# Patient Record
Sex: FEMALE | Race: WHITE | NOT HISPANIC OR LATINO | Employment: FULL TIME | URBAN - METROPOLITAN AREA
[De-identification: names, ages, dates, MRNs, and addresses within clinical notes are randomized per-mention and may not be internally consistent; named-entity substitution may affect disease eponyms.]

---

## 2017-01-24 RX ORDER — LOSARTAN POTASSIUM AND HYDROCHLOROTHIAZIDE 12.5; 1 MG/1; MG/1
TABLET ORAL
Qty: 90 TABLET | Refills: 2 | Status: SHIPPED | OUTPATIENT
Start: 2017-01-24 | End: 2017-10-28 | Stop reason: SDUPTHER

## 2017-04-12 ENCOUNTER — OFFICE VISIT (OUTPATIENT)
Dept: INTERNAL MEDICINE | Facility: CLINIC | Age: 52
End: 2017-04-12

## 2017-04-12 VITALS
HEIGHT: 65 IN | BODY MASS INDEX: 23.46 KG/M2 | WEIGHT: 140.8 LBS | SYSTOLIC BLOOD PRESSURE: 118 MMHG | DIASTOLIC BLOOD PRESSURE: 80 MMHG | HEART RATE: 74 BPM

## 2017-04-12 DIAGNOSIS — G89.4 CHRONIC PAIN SYNDROME: ICD-10-CM

## 2017-04-12 DIAGNOSIS — F39 MOOD DISORDER (HCC): ICD-10-CM

## 2017-04-12 DIAGNOSIS — Z12.31 ENCOUNTER FOR SCREENING MAMMOGRAM FOR BREAST CANCER: ICD-10-CM

## 2017-04-12 DIAGNOSIS — M54.2 NECK PAIN: ICD-10-CM

## 2017-04-12 DIAGNOSIS — J30.2 SEASONAL ALLERGIC RHINITIS, UNSPECIFIED ALLERGIC RHINITIS TRIGGER: ICD-10-CM

## 2017-04-12 DIAGNOSIS — I10 ESSENTIAL HYPERTENSION: Primary | ICD-10-CM

## 2017-04-12 DIAGNOSIS — R53.82 CHRONIC FATIGUE: ICD-10-CM

## 2017-04-12 DIAGNOSIS — F51.01 PRIMARY INSOMNIA: ICD-10-CM

## 2017-04-12 DIAGNOSIS — J45.20 MILD INTERMITTENT ASTHMA WITHOUT COMPLICATION: ICD-10-CM

## 2017-04-12 DIAGNOSIS — E55.9 VITAMIN D DEFICIENCY: ICD-10-CM

## 2017-04-12 PROCEDURE — 99214 OFFICE O/P EST MOD 30 MIN: CPT | Performed by: INTERNAL MEDICINE

## 2017-04-12 RX ORDER — FLUTICASONE PROPIONATE 50 MCG
2 SPRAY, SUSPENSION (ML) NASAL DAILY
Qty: 1 EACH | Refills: 12 | Status: SHIPPED | OUTPATIENT
Start: 2017-04-12 | End: 2017-05-12

## 2017-04-12 RX ORDER — LORAZEPAM 1 MG/1
1 TABLET ORAL EVERY 8 HOURS PRN
Qty: 50 TABLET | Refills: 0 | OUTPATIENT
Start: 2017-04-12 | End: 2017-06-08 | Stop reason: SDUPTHER

## 2017-04-12 RX ORDER — ESTRADIOL 0.1 MG/G
2 CREAM VAGINAL 2 TIMES WEEKLY
COMMUNITY
Start: 2017-02-09 | End: 2018-10-05 | Stop reason: SDUPTHER

## 2017-04-12 RX ORDER — HYDROCODONE BITARTRATE AND ACETAMINOPHEN 10; 325 MG/1; MG/1
1 TABLET ORAL EVERY 6 HOURS PRN
Qty: 60 TABLET | Refills: 0 | Status: SHIPPED | OUTPATIENT
Start: 2017-04-12 | End: 2017-08-18 | Stop reason: SDUPTHER

## 2017-04-12 RX ORDER — ESTRADIOL 0.07 MG/D
1 FILM, EXTENDED RELEASE TRANSDERMAL 2 TIMES WEEKLY
COMMUNITY
Start: 2017-02-09 | End: 2017-12-29

## 2017-04-12 NOTE — PROGRESS NOTES
"Geena Morrison is a 51 y.o. female here for   Chief Complaint   Patient presents with   • Follow-up     4 MO F/U   • Sinus Problem     X 1 WK, SORE THROAT, HA   • Hypertension   • Neck Pain   • Hyperlipidemia   .    Vitals:    04/12/17 0803   BP: 118/80   Pulse: 74   Weight: 140 lb 12.8 oz (63.9 kg)   Height: 65\" (165.1 cm)       Sinus Problem   This is a new problem. The current episode started in the past 7 days. The problem is unchanged. There has been no fever. She is experiencing no pain. Associated symptoms include congestion, coughing, neck pain, sneezing and a sore throat. Pertinent negatives include no chills, ear pain, shortness of breath or sinus pressure.   Hypertension   This is a chronic problem. The current episode started more than 1 year ago. The problem is unchanged. The problem is controlled. Associated symptoms include neck pain. Pertinent negatives include no chest pain, palpitations, peripheral edema or shortness of breath.   Neck Pain    This is a chronic problem. The current episode started more than 1 year ago. The problem occurs intermittently. The problem has been unchanged. The pain is moderate. Pertinent negatives include no chest pain, fever or trouble swallowing.        Encounter Diagnoses   Name Primary?   • Essential hypertension Yes   • Primary insomnia    • Chronic fatigue    • Chronic pain syndrome    • Mood disorder    • Encounter for screening mammogram for breast cancer    • Neck pain    • Mild intermittent asthma without complication    • Vitamin D deficiency    • Seasonal allergic rhinitis, unspecified allergic rhinitis trigger        The following portions of the patient's history were reviewed and updated as appropriate: allergies, current medications, past social history and problem list.    Review of Systems   Constitutional: Positive for fatigue. Negative for activity change, appetite change, chills and fever.   HENT: Positive for congestion, postnasal drip, " rhinorrhea, sneezing, sore throat and voice change. Negative for ear pain, sinus pressure and trouble swallowing.    Respiratory: Positive for cough. Negative for chest tightness, shortness of breath and wheezing.    Cardiovascular: Negative for chest pain, palpitations and leg swelling.   Musculoskeletal: Positive for neck pain.   Psychiatric/Behavioral: Positive for sleep disturbance. Negative for dysphoric mood. The patient is nervous/anxious.        Objective   Physical Exam   Constitutional: She appears well-developed and well-nourished. No distress.   HENT:   Head: Normocephalic.   Right Ear: External ear normal. Tympanic membrane is bulging. Tympanic membrane is not erythematous.   Left Ear: External ear normal. Tympanic membrane is bulging. Tympanic membrane is not erythematous.   Nose: Right sinus exhibits no frontal sinus tenderness. Left sinus exhibits no frontal sinus tenderness.   Mouth/Throat: Oropharynx is clear and moist. No oropharyngeal exudate.   Neck: Normal range of motion. Neck supple.   Cardiovascular: Normal rate, regular rhythm and normal heart sounds.    Pulmonary/Chest: Effort normal and breath sounds normal. No respiratory distress. She has no wheezes. She has no rales. She exhibits no tenderness.   Musculoskeletal: She exhibits no edema.   Lymphadenopathy:     She has no cervical adenopathy.   Psychiatric: She has a normal mood and affect. Her behavior is normal.   Nursing note and vitals reviewed.      Assessment/Plan   Problem List Items Addressed This Visit        Unprioritized    Asthma    Hypertension - Primary    Insomnia    Mood disorder    Relevant Medications    LORazepam (ATIVAN) 1 MG tablet    Neck pain     Epidural injections 2012           Relevant Medications    HYDROcodone-acetaminophen (NORCO)  MG per tablet    Chronic pain    Chronic fatigue    Relevant Medications    LORazepam (ATIVAN) 1 MG tablet    Vitamin D deficiency     D=25           Other Visit Diagnoses      Encounter for screening mammogram for breast cancer        Relevant Orders    Mammo Screening Bilateral With CAD    Seasonal allergic rhinitis, unspecified allergic rhinitis trigger        Relevant Medications    fluticasone (FLONASE) 50 MCG/ACT nasal spray         Increased sinus congestion from allergies - need to add allegra to singulair daily.  Need daily flonase & sinus salt water qid.  Call if sx persist/worsen.   Asthma stable.   HTN stable.     High chol - need rechk this yr.   Anxiety - ok to use ativan sparingly (no driving).   Chronic neck pain - ok to use hydrocodone sparingly (no driving).

## 2017-06-08 DIAGNOSIS — F39 MOOD DISORDER (HCC): ICD-10-CM

## 2017-06-08 RX ORDER — LORAZEPAM 1 MG/1
TABLET ORAL
Qty: 50 TABLET | Refills: 1 | OUTPATIENT
Start: 2017-06-08 | End: 2017-12-29 | Stop reason: SDUPTHER

## 2017-06-19 DIAGNOSIS — R52 PAIN: ICD-10-CM

## 2017-06-19 RX ORDER — GABAPENTIN 600 MG/1
TABLET ORAL
Qty: 90 TABLET | Refills: 3 | Status: SHIPPED | OUTPATIENT
Start: 2017-06-19 | End: 2017-08-18 | Stop reason: SDUPTHER

## 2017-08-18 ENCOUNTER — OFFICE VISIT (OUTPATIENT)
Dept: INTERNAL MEDICINE | Facility: CLINIC | Age: 52
End: 2017-08-18

## 2017-08-18 ENCOUNTER — APPOINTMENT (OUTPATIENT)
Dept: WOMENS IMAGING | Facility: HOSPITAL | Age: 52
End: 2017-08-18

## 2017-08-18 VITALS
HEIGHT: 64 IN | SYSTOLIC BLOOD PRESSURE: 118 MMHG | WEIGHT: 144.2 LBS | DIASTOLIC BLOOD PRESSURE: 78 MMHG | BODY MASS INDEX: 24.62 KG/M2

## 2017-08-18 DIAGNOSIS — F41.9 ANXIETY: ICD-10-CM

## 2017-08-18 DIAGNOSIS — R53.82 CHRONIC FATIGUE: ICD-10-CM

## 2017-08-18 DIAGNOSIS — J45.20 MILD INTERMITTENT ASTHMA WITHOUT COMPLICATION: ICD-10-CM

## 2017-08-18 DIAGNOSIS — F51.01 PRIMARY INSOMNIA: ICD-10-CM

## 2017-08-18 DIAGNOSIS — M54.2 NECK PAIN: Primary | ICD-10-CM

## 2017-08-18 DIAGNOSIS — Z12.31 ENCOUNTER FOR SCREENING MAMMOGRAM FOR BREAST CANCER: ICD-10-CM

## 2017-08-18 DIAGNOSIS — E55.9 VITAMIN D DEFICIENCY: ICD-10-CM

## 2017-08-18 DIAGNOSIS — F39 MOOD DISORDER (HCC): ICD-10-CM

## 2017-08-18 DIAGNOSIS — G89.4 CHRONIC PAIN SYNDROME: ICD-10-CM

## 2017-08-18 DIAGNOSIS — R52 PAIN: ICD-10-CM

## 2017-08-18 DIAGNOSIS — R73.09 ELEVATED GLUCOSE: ICD-10-CM

## 2017-08-18 DIAGNOSIS — I10 ESSENTIAL HYPERTENSION: ICD-10-CM

## 2017-08-18 PROCEDURE — 99214 OFFICE O/P EST MOD 30 MIN: CPT | Performed by: INTERNAL MEDICINE

## 2017-08-18 PROCEDURE — 77067 SCR MAMMO BI INCL CAD: CPT | Performed by: RADIOLOGY

## 2017-08-18 PROCEDURE — 77067 SCR MAMMO BI INCL CAD: CPT | Performed by: INTERNAL MEDICINE

## 2017-08-18 RX ORDER — GABAPENTIN 800 MG/1
TABLET ORAL
Qty: 90 TABLET | Refills: 1 | Status: SHIPPED | OUTPATIENT
Start: 2017-08-18 | End: 2017-10-27 | Stop reason: SDUPTHER

## 2017-08-18 RX ORDER — HYDROCODONE BITARTRATE AND ACETAMINOPHEN 10; 325 MG/1; MG/1
1 TABLET ORAL EVERY 6 HOURS PRN
Qty: 60 TABLET | Refills: 0 | Status: SHIPPED | OUTPATIENT
Start: 2017-08-18 | End: 2017-10-27 | Stop reason: SDUPTHER

## 2017-08-18 NOTE — PROGRESS NOTES
"Geena Morrison is a 51 y.o. female here for   Chief Complaint   Patient presents with   • Anxiety     4 month follow-up   • Hypertension     4 month follow-up   • Pain     4 month follow-up   .    Vitals:    08/18/17 0928   BP: 118/78   BP Location: Left arm   Patient Position: Sitting   Cuff Size: Adult   Weight: 144 lb 3.2 oz (65.4 kg)   Height: 64.25\" (163.2 cm)       Anxiety   Presents for follow-up visit. Symptoms include nervous/anxious behavior (need ativan 3x weekly). Patient reports no chest pain, palpitations or shortness of breath. Symptoms occur most days.       Hypertension   This is a chronic problem. The current episode started more than 1 year ago. The problem is unchanged. The problem is controlled. Associated symptoms include anxiety. Pertinent negatives include no chest pain, palpitations or shortness of breath.   Pain   This is a chronic problem. The current episode started more than 1 year ago. The problem occurs constantly. The problem has been gradually worsening. Associated symptoms include fatigue. Pertinent negatives include no chest pain, chills, coughing or fever.        Encounter Diagnoses   Name Primary?   • Neck pain Yes   • Mood disorder    • Vitamin D deficiency    • Essential hypertension    • Elevated glucose    • Chronic fatigue    • Chronic pain syndrome    • Anxiety    • Mild intermittent asthma without complication    • Pain    • Primary insomnia        The following portions of the patient's history were reviewed and updated as appropriate: allergies, current medications, past social history and problem list.    Review of Systems   Constitutional: Positive for fatigue. Negative for chills and fever.   Respiratory: Negative for cough, shortness of breath and wheezing.    Cardiovascular: Negative for chest pain, palpitations and leg swelling.   Psychiatric/Behavioral: Positive for sleep disturbance (60% better with gabapentin). Negative for dysphoric mood. The " patient is nervous/anxious (need ativan 3x weekly).        Objective   Physical Exam   Constitutional: She appears well-developed and well-nourished. No distress.   Cardiovascular: Normal rate, regular rhythm and normal heart sounds.    Pulmonary/Chest: No respiratory distress. She has no wheezes. She has no rales. She exhibits no tenderness.   Musculoskeletal: She exhibits no edema.   Psychiatric: She has a normal mood and affect. Her behavior is normal.   Nursing note and vitals reviewed.      Assessment/Plan   Problem List Items Addressed This Visit        Unprioritized    Asthma    Hypertension    Insomnia    Relevant Medications    gabapentin (NEURONTIN) 800 MG tablet    Mood disorder    Elevated glucose    Neck pain - Primary    Relevant Medications    HYDROcodone-acetaminophen (NORCO)  MG per tablet    Anxiety    Chronic pain    Chronic fatigue    Vitamin D deficiency      Other Visit Diagnoses     Pain        Relevant Medications    HYDROcodone-acetaminophen (NORCO)  MG per tablet    gabapentin (NEURONTIN) 800 MG tablet         Chronic pain - need to increase lexapro (from 1/2 to 1 daily) & gabapentin (from 600 to 800 mg/d).  Continue to use hydrocodone sparingly.   Insomnia - need to increase gabapentin to 800 mg qhs.  Call if no better. High chol & sugar - need diet & exercise.    Labs at next visit (4 mos)

## 2017-09-19 ENCOUNTER — PATIENT MESSAGE (OUTPATIENT)
Dept: INTERNAL MEDICINE | Facility: CLINIC | Age: 52
End: 2017-09-19

## 2017-09-19 NOTE — TELEPHONE ENCOUNTER
From: Adela Morrison  To: Jamila Huston MD  Sent: 9/19/2017 3:02 PM EDT  Subject: Prescription Question    Hello Dr. Huston, my appointment with you was on 8/18 and you gave me my 4 month Hydrocodone prescription for my severe neck pain. When I took the prescription to the pharmacy they couldn't fill it and said to bring it back. Since I had some tablets left I was not in any hurry to go back to the pharmacy. But then I ran out of the medicine. I have searched every day for over two weeks and haven't been able to locate my prescription. I'm thinking it got thrown away in the garbage by my 2 year old granddaughter (she likes to throw things away i.e., our keys, magazines, silverware, etc.). I was trying to see if I could just manage the pain without bothering you and taking over the counter meds, but I can't and the pain is making me not sleep and irritable at work and at home. Is there anyway you can rewrite the prescription? I've never had this happen before and I will be more careful in the future.

## 2017-10-27 DIAGNOSIS — R52 PAIN: ICD-10-CM

## 2017-10-27 DIAGNOSIS — F51.01 PRIMARY INSOMNIA: ICD-10-CM

## 2017-10-27 DIAGNOSIS — M54.2 NECK PAIN: ICD-10-CM

## 2017-10-27 RX ORDER — HYDROCODONE BITARTRATE AND ACETAMINOPHEN 10; 325 MG/1; MG/1
1 TABLET ORAL EVERY 6 HOURS PRN
Qty: 60 TABLET | Refills: 0 | Status: SHIPPED | OUTPATIENT
Start: 2017-10-27 | End: 2017-12-29 | Stop reason: SDUPTHER

## 2017-10-27 RX ORDER — GABAPENTIN 800 MG/1
TABLET ORAL
Qty: 180 TABLET | Refills: 1 | Status: SHIPPED | OUTPATIENT
Start: 2017-10-27 | End: 2017-10-30

## 2017-10-27 NOTE — TELEPHONE ENCOUNTER
----- Message from Cindy Tapia MA sent at 10/27/2017  1:59 PM EDT -----  Pt needs a new prescription as August script  before she could fill  Pt wants to  script today-very uncomfortable    Hydrocodone 10/325mg    lov   Next appt     Pt#[phone]

## 2017-10-27 NOTE — TELEPHONE ENCOUNTER
Please review refill request:    I called [facility] pharmacy listed and the pharmacist stated she has been jumping around to different stores and they ended up canceling out the rx from 8/18/17.     Last OV: 8/18/17    Last Prescribed: 8/18/17    ZEINA: Good until 12/19/17    Thank you

## 2017-10-28 DIAGNOSIS — F39 MOOD DISORDER (HCC): ICD-10-CM

## 2017-10-30 DIAGNOSIS — R52 PAIN: ICD-10-CM

## 2017-10-30 DIAGNOSIS — F51.01 PRIMARY INSOMNIA: ICD-10-CM

## 2017-10-30 RX ORDER — GABAPENTIN 800 MG/1
800 TABLET ORAL NIGHTLY
Qty: 90 TABLET | Refills: 1 | Status: SHIPPED | OUTPATIENT
Start: 2017-10-30 | End: 2017-12-29 | Stop reason: SDUPTHER

## 2017-10-30 RX ORDER — LOSARTAN POTASSIUM AND HYDROCHLOROTHIAZIDE 12.5; 1 MG/1; MG/1
TABLET ORAL
Qty: 90 TABLET | Refills: 2 | Status: SHIPPED | OUTPATIENT
Start: 2017-10-30 | End: 2018-08-09 | Stop reason: SDUPTHER

## 2017-10-30 RX ORDER — ESCITALOPRAM OXALATE 20 MG/1
TABLET ORAL
Qty: 90 TABLET | Refills: 2 | Status: SHIPPED | OUTPATIENT
Start: 2017-10-30 | End: 2017-12-29 | Stop reason: SDUPTHER

## 2017-12-29 ENCOUNTER — OFFICE VISIT (OUTPATIENT)
Dept: INTERNAL MEDICINE | Facility: CLINIC | Age: 52
End: 2017-12-29

## 2017-12-29 VITALS
SYSTOLIC BLOOD PRESSURE: 140 MMHG | WEIGHT: 149.6 LBS | BODY MASS INDEX: 25.54 KG/M2 | HEIGHT: 64 IN | DIASTOLIC BLOOD PRESSURE: 84 MMHG

## 2017-12-29 DIAGNOSIS — R52 PAIN: ICD-10-CM

## 2017-12-29 DIAGNOSIS — R73.09 ELEVATED GLUCOSE: ICD-10-CM

## 2017-12-29 DIAGNOSIS — J45.20 MILD INTERMITTENT ASTHMA WITHOUT COMPLICATION: ICD-10-CM

## 2017-12-29 DIAGNOSIS — M54.2 NECK PAIN: ICD-10-CM

## 2017-12-29 DIAGNOSIS — F39 MOOD DISORDER (HCC): ICD-10-CM

## 2017-12-29 DIAGNOSIS — F51.01 PRIMARY INSOMNIA: ICD-10-CM

## 2017-12-29 DIAGNOSIS — I10 ESSENTIAL HYPERTENSION: Primary | ICD-10-CM

## 2017-12-29 DIAGNOSIS — E55.9 VITAMIN D DEFICIENCY: ICD-10-CM

## 2017-12-29 DIAGNOSIS — G89.4 CHRONIC PAIN SYNDROME: ICD-10-CM

## 2017-12-29 DIAGNOSIS — Z78.0 MENOPAUSE: ICD-10-CM

## 2017-12-29 LAB
ALBUMIN SERPL-MCNC: 4 G/DL (ref 3.5–5.2)
ALBUMIN/GLOB SERPL: 1.3 G/DL
ALP SERPL-CCNC: 60 U/L (ref 39–117)
ALT SERPL-CCNC: 13 U/L (ref 1–33)
APPEARANCE UR: ABNORMAL
AST SERPL-CCNC: 21 U/L (ref 1–32)
BASOPHILS # BLD MANUAL: 0 10*3/MM3 (ref 0–0.2)
BASOPHILS NFR BLD MANUAL: 0 % (ref 0–1.5)
BILIRUB SERPL-MCNC: 0.8 MG/DL (ref 0.1–1.2)
BILIRUB UR QL STRIP: NEGATIVE
BUN SERPL-MCNC: 13 MG/DL (ref 6–20)
BUN/CREAT SERPL: 18.6 (ref 7–25)
CALCIUM SERPL-MCNC: 9.4 MG/DL (ref 8.6–10.5)
CHLORIDE SERPL-SCNC: 100 MMOL/L (ref 98–107)
CHOLEST SERPL-MCNC: 224 MG/DL (ref 0–200)
CO2 SERPL-SCNC: 29.9 MMOL/L (ref 22–29)
COLOR UR: YELLOW
CREAT SERPL-MCNC: 0.7 MG/DL (ref 0.57–1)
DIFFERENTIAL COMMENT: NORMAL
EOSINOPHIL # BLD MANUAL: 0.32 10*3/MM3 (ref 0–0.7)
EOSINOPHIL # BLD MANUAL: 5 % (ref 0.3–6.2)
ERYTHROCYTE [DISTWIDTH] IN BLOOD BY AUTOMATED COUNT: 12.5 % (ref 11.7–13)
GLOBULIN SER CALC-MCNC: 3.1 GM/DL
GLUCOSE SERPL-MCNC: 99 MG/DL (ref 65–99)
GLUCOSE UR QL: NEGATIVE
HBA1C MFR BLD: 5.3 % (ref 4.8–5.6)
HCT VFR BLD AUTO: 42.3 % (ref 35.6–45.5)
HDLC SERPL-MCNC: 63 MG/DL (ref 40–60)
HGB BLD-MCNC: 13.7 G/DL (ref 11.9–15.5)
HGB UR QL STRIP: NEGATIVE
KETONES UR QL STRIP: NEGATIVE
LABORATORY COMMENT REPORT: NORMAL
LDLC SERPL CALC-MCNC: 131 MG/DL (ref 0–100)
LEUKOCYTE ESTERASE UR QL STRIP: NEGATIVE
LYMPHOCYTES # BLD MANUAL: 2.17 10*3/MM3 (ref 0.9–4.8)
LYMPHOCYTES NFR BLD MANUAL: 34 % (ref 19.6–45.3)
MCH RBC QN AUTO: 31.5 PG (ref 26.9–32)
MCHC RBC AUTO-ENTMCNC: 32.4 G/DL (ref 32.4–36.3)
MCV RBC AUTO: 97.2 FL (ref 80.5–98.2)
MONOCYTES # BLD MANUAL: 0.38 10*3/MM3 (ref 0.2–1.2)
MONOCYTES NFR BLD MANUAL: 6 % (ref 5–12)
NEUTROPHILS # BLD MANUAL: 3.51 10*3/MM3 (ref 1.9–8.1)
NEUTROPHILS NFR BLD MANUAL: 55 % (ref 42.7–76)
NITRITE UR QL STRIP: NEGATIVE
PH UR STRIP.AUTO: 7 [PH] (ref 5–8)
PLATELET # BLD AUTO: 253 10*3/MM3 (ref 140–500)
PLT COMMENT: NORMAL
POTASSIUM SERPL-SCNC: 4.8 MMOL/L (ref 3.5–5.2)
PROT SERPL-MCNC: 7.1 G/DL (ref 6–8.5)
PROTEIN: NEGATIVE
RBC # BLD AUTO: 4.35 10*6/MM3 (ref 3.9–5.2)
SODIUM SERPL-SCNC: 140 MMOL/L (ref 136–145)
SP GR UR: 1.01 (ref 1–1.03)
TRIGL SERPL-MCNC: 152 MG/DL (ref 0–150)
TSH SERPL-ACNC: 2.24 MIU/ML (ref 0.27–4.2)
UROBILINOGEN UR STRIP-MCNC: ABNORMAL MG/DL
VLDLC SERPL-MCNC: 30.4 MG/DL (ref 5–40)
WBC # BLD AUTO: 6.39 10*3/MM3 (ref 4.5–10.7)

## 2017-12-29 PROCEDURE — 99214 OFFICE O/P EST MOD 30 MIN: CPT | Performed by: INTERNAL MEDICINE

## 2017-12-29 PROCEDURE — 36415 COLL VENOUS BLD VENIPUNCTURE: CPT | Performed by: INTERNAL MEDICINE

## 2017-12-29 RX ORDER — HYDROCHLOROTHIAZIDE 12.5 MG/1
12.5 TABLET ORAL DAILY
Qty: 30 TABLET | Refills: 1 | Status: SHIPPED | OUTPATIENT
Start: 2017-12-29 | End: 2018-08-27

## 2017-12-29 RX ORDER — HYDROCODONE BITARTRATE AND ACETAMINOPHEN 10; 325 MG/1; MG/1
1 TABLET ORAL EVERY 6 HOURS PRN
Qty: 60 TABLET | Refills: 0 | Status: SHIPPED | OUTPATIENT
Start: 2017-12-29 | End: 2018-05-29 | Stop reason: SDUPTHER

## 2017-12-29 RX ORDER — LORAZEPAM 1 MG/1
1 TABLET ORAL EVERY 8 HOURS PRN
Qty: 50 TABLET | Refills: 1 | OUTPATIENT
Start: 2017-12-29 | End: 2018-10-05 | Stop reason: SDUPTHER

## 2017-12-29 RX ORDER — ESTRADIOL 0.05 MG/D
1 FILM, EXTENDED RELEASE TRANSDERMAL WEEKLY
Qty: 24 PATCH | Refills: 3 | Status: SHIPPED | OUTPATIENT
Start: 2017-12-29 | End: 2019-06-10 | Stop reason: SDUPTHER

## 2017-12-29 RX ORDER — ESCITALOPRAM OXALATE 20 MG/1
20 TABLET ORAL DAILY
Qty: 90 TABLET | Refills: 2 | Status: SHIPPED | OUTPATIENT
Start: 2017-12-29 | End: 2018-10-05 | Stop reason: SDUPTHER

## 2017-12-29 RX ORDER — MONTELUKAST SODIUM 10 MG/1
10 TABLET ORAL NIGHTLY
Qty: 90 TABLET | Refills: 3 | Status: SHIPPED | OUTPATIENT
Start: 2017-12-29 | End: 2018-10-05 | Stop reason: SDUPTHER

## 2017-12-29 RX ORDER — GABAPENTIN 800 MG/1
800 TABLET ORAL NIGHTLY
Qty: 90 TABLET | Refills: 1 | Status: SHIPPED | OUTPATIENT
Start: 2017-12-29 | End: 2018-05-29 | Stop reason: SDUPTHER

## 2017-12-29 NOTE — PROGRESS NOTES
"Geena Morrison is a 52 y.o. female here for   Chief Complaint   Patient presents with   • Anxiety   • Hypertension   • Pain   .    Vitals:    12/29/17 1020 12/29/17 1101 12/29/17 1654   BP: 138/94 140/90 140/84   BP Location: Left arm     Patient Position: Sitting     Cuff Size: Adult     Weight: 67.9 kg (149 lb 9.6 oz)     Height: 163.2 cm (64.25\")         Body mass index is 25.48 kg/(m^2).    Anxiety   Presents for follow-up visit. Symptoms include nervous/anxious behavior. Patient reports no chest pain, palpitations or shortness of breath. Symptoms occur occasionally. The severity of symptoms is mild.       Hypertension   This is a chronic problem. The current episode started more than 1 year ago. The problem has been gradually worsening since onset. The problem is controlled. Associated symptoms include anxiety and neck pain. Pertinent negatives include no chest pain, palpitations or shortness of breath.   Pain   Associated symptoms include fatigue and neck pain. Pertinent negatives include no chest pain, chills, coughing or fever.        The following portions of the patient's history were reviewed and updated as appropriate: allergies, current medications, past social history and problem list.    Review of Systems   Constitutional: Positive for fatigue. Negative for chills and fever.   Respiratory: Negative for cough, shortness of breath and wheezing.    Cardiovascular: Negative for chest pain, palpitations and leg swelling.   Musculoskeletal: Positive for neck pain and neck stiffness.   Psychiatric/Behavioral: Negative for dysphoric mood and sleep disturbance. The patient is nervous/anxious.        Objective   Physical Exam   Constitutional: She appears well-developed and well-nourished. No distress.   Cardiovascular: Normal rate, regular rhythm and normal heart sounds.    Pulmonary/Chest: No respiratory distress. She has no wheezes. She has no rales. She exhibits no tenderness.   Musculoskeletal: " She exhibits no edema.   Psychiatric: She has a normal mood and affect. Her behavior is normal.   Nursing note and vitals reviewed.      Assessment/Plan   Diagnoses and all orders for this visit:    Essential hypertension  Comments:  borderline high - need weekly chk & call if bp over 140/90 - will need additional med if can't lower bp with diet/ex  Orders:  -     CBC Auto Differential; Future  -     Comprehensive Metabolic Panel; Future  -     Lipid Panel; Future  -     TSH; Future  -     Urinalysis With / Microscopic If Indicated - Urine, Clean Catch; Future  -     hydrochlorothiazide (HYDRODIURIL) 12.5 MG tablet; Take 1 tablet by mouth Daily.  -     CBC Auto Differential  -     Comprehensive Metabolic Panel  -     Lipid Panel  -     TSH  -     Urinalysis With / Microscopic If Indicated - Urine, Clean Catch    Elevated glucose  Comments:  need low sugar diet  Orders:  -     Hemoglobin A1c; Future  -     Hemoglobin A1c    Chronic pain syndrome  Comments:  neck pain - use narcotic sparingly - need stretches, correct posture, etc.    Mild intermittent asthma without complication  Comments:  call if sx  Orders:  -     montelukast (SINGULAIR) 10 MG tablet; Take 1 tablet by mouth Every Night.    Mood disorder  Comments:  stable - continue ativan sparingly (not daily)  Orders:  -     LORazepam (ATIVAN) 1 MG tablet; Take 1 tablet by mouth Every 8 (Eight) Hours As Needed for Anxiety.  -     escitalopram (LEXAPRO) 20 MG tablet; Take 1 tablet by mouth Daily.    Neck pain  Comments:  call if worse  Orders:  -     HYDROcodone-acetaminophen (NORCO)  MG per tablet; Take 1 tablet by mouth Every 6 (Six) Hours As Needed for Moderate Pain .    Pain  -     HYDROcodone-acetaminophen (NORCO)  MG per tablet; Take 1 tablet by mouth Every 6 (Six) Hours As Needed for Moderate Pain .  -     gabapentin (NEURONTIN) 800 MG tablet; Take 1 tablet by mouth Every Night.    Primary insomnia  Comments:  ok with nightly  gabapentin  Orders:  -     gabapentin (NEURONTIN) 800 MG tablet; Take 1 tablet by mouth Every Night.    Menopause  Comments:  need decreased dose of estrogen - will decrease again in 1 yr (or sooner if no severe hot flashes)  Orders:  -     estradiol (MINIVELLE, VIVELLE-DOT) 0.05 MG/24HR patch; Place 1 patch on the skin 1 (One) Time Per Week.    Vitamin D deficiency  Comments:  need 2,000 units/d

## 2018-04-25 NOTE — TELEPHONE ENCOUNTER
Patient informed her Norco  mg is ready for pick.   She asked that her Gabapentin be faxed over to [facility].      What Is The Reason For Today's Visit?: the risk of recurrence, and the development of new lesions

## 2018-04-27 ENCOUNTER — OFFICE VISIT (OUTPATIENT)
Dept: INTERNAL MEDICINE | Facility: CLINIC | Age: 53
End: 2018-04-27

## 2018-04-27 VITALS
SYSTOLIC BLOOD PRESSURE: 132 MMHG | BODY MASS INDEX: 23.05 KG/M2 | WEIGHT: 135 LBS | DIASTOLIC BLOOD PRESSURE: 80 MMHG | HEIGHT: 64 IN

## 2018-04-27 DIAGNOSIS — M25.511 ACUTE PAIN OF RIGHT SHOULDER: Primary | ICD-10-CM

## 2018-04-27 PROCEDURE — 99213 OFFICE O/P EST LOW 20 MIN: CPT | Performed by: NURSE PRACTITIONER

## 2018-04-27 RX ORDER — CYCLOBENZAPRINE HCL 10 MG
10 TABLET ORAL 3 TIMES DAILY PRN
Qty: 30 TABLET | Refills: 0 | Status: SHIPPED | OUTPATIENT
Start: 2018-04-27 | End: 2018-08-27

## 2018-04-27 RX ORDER — METHYLPREDNISOLONE 4 MG/1
TABLET ORAL
Qty: 21 TABLET | Refills: 0 | Status: SHIPPED | OUTPATIENT
Start: 2018-04-27 | End: 2018-05-04 | Stop reason: SDUPTHER
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
## 2018-05-04 DIAGNOSIS — M25.511 ACUTE PAIN OF RIGHT SHOULDER: Primary | ICD-10-CM

## 2018-05-04 DIAGNOSIS — M25.511 ACUTE PAIN OF RIGHT SHOULDER: ICD-10-CM

## 2018-05-04 RX ORDER — METHYLPREDNISOLONE 4 MG/1
TABLET ORAL
Qty: 21 TABLET | Refills: 0 | Status: SHIPPED | OUTPATIENT
Start: 2018-05-04 | End: 2018-05-10

## 2018-05-29 ENCOUNTER — OFFICE VISIT (OUTPATIENT)
Dept: INTERNAL MEDICINE | Facility: CLINIC | Age: 53
End: 2018-05-29

## 2018-05-29 VITALS
BODY MASS INDEX: 21.89 KG/M2 | DIASTOLIC BLOOD PRESSURE: 84 MMHG | HEIGHT: 64 IN | SYSTOLIC BLOOD PRESSURE: 128 MMHG | WEIGHT: 128.2 LBS

## 2018-05-29 DIAGNOSIS — F51.01 PRIMARY INSOMNIA: ICD-10-CM

## 2018-05-29 DIAGNOSIS — R52 PAIN: ICD-10-CM

## 2018-05-29 DIAGNOSIS — M25.511 CHRONIC RIGHT SHOULDER PAIN: ICD-10-CM

## 2018-05-29 DIAGNOSIS — R73.09 ELEVATED GLUCOSE: ICD-10-CM

## 2018-05-29 DIAGNOSIS — G89.29 CHRONIC RIGHT SHOULDER PAIN: ICD-10-CM

## 2018-05-29 DIAGNOSIS — I10 ESSENTIAL HYPERTENSION: Primary | ICD-10-CM

## 2018-05-29 DIAGNOSIS — M54.2 NECK PAIN: ICD-10-CM

## 2018-05-29 DIAGNOSIS — R53.82 CHRONIC FATIGUE: ICD-10-CM

## 2018-05-29 DIAGNOSIS — F39 MOOD DISORDER (HCC): ICD-10-CM

## 2018-05-29 DIAGNOSIS — E55.9 VITAMIN D DEFICIENCY: ICD-10-CM

## 2018-05-29 PROCEDURE — 99213 OFFICE O/P EST LOW 20 MIN: CPT | Performed by: INTERNAL MEDICINE

## 2018-05-29 RX ORDER — GABAPENTIN 800 MG/1
800 TABLET ORAL NIGHTLY
Qty: 90 TABLET | Refills: 1 | Status: SHIPPED | OUTPATIENT
Start: 2018-05-29 | End: 2018-10-05 | Stop reason: SDUPTHER

## 2018-05-29 RX ORDER — HYDROCODONE BITARTRATE AND ACETAMINOPHEN 10; 325 MG/1; MG/1
1 TABLET ORAL EVERY 6 HOURS PRN
Qty: 60 TABLET | Refills: 0 | Status: SHIPPED | OUTPATIENT
Start: 2018-05-29 | End: 2018-10-05 | Stop reason: SDUPTHER

## 2018-05-29 NOTE — PROGRESS NOTES
"Geena Morrison is a 52 y.o. female here for   Chief Complaint   Patient presents with   • Anxiety   • Hypertension   • Pain   .    Vitals:    05/29/18 1032   BP: 128/84   BP Location: Left arm   Patient Position: Sitting   Cuff Size: Adult   Weight: 58.2 kg (128 lb 3.2 oz)   Height: 163.2 cm (64.25\")       Body mass index is 21.83 kg/m².    Anxiety   Presents for follow-up visit. Symptoms include excessive worry, insomnia and nervous/anxious behavior. Patient reports no chest pain, palpitations or shortness of breath.       Hypertension   This is a chronic problem. The current episode started more than 1 year ago. The problem is unchanged. The problem is controlled. Associated symptoms include anxiety. Pertinent negatives include no chest pain, palpitations or shortness of breath.   Pain   This is a new problem. The current episode started more than 1 month ago. The problem occurs constantly. The problem has been unchanged. Associated symptoms include arthralgias (right shoulder no better) and fatigue. Pertinent negatives include no chest pain, chills, coughing or fever. She has tried rest, ice and heat for the symptoms.        The following portions of the patient's history were reviewed and updated as appropriate: allergies, current medications, past social history and problem list.    Review of Systems   Constitutional: Positive for fatigue. Negative for chills and fever.   Respiratory: Negative for cough, shortness of breath and wheezing.    Cardiovascular: Negative for chest pain, palpitations and leg swelling.   Musculoskeletal: Positive for arthralgias (right shoulder no better).   Psychiatric/Behavioral: Negative for dysphoric mood and sleep disturbance. The patient is nervous/anxious and has insomnia.        Objective   Physical Exam   Constitutional: She appears well-developed and well-nourished. No distress.   Cardiovascular: Normal rate, regular rhythm and normal heart sounds.  "   Pulmonary/Chest: No respiratory distress. She has no wheezes. She has no rales. She exhibits no tenderness.   Musculoskeletal: She exhibits no edema.   Psychiatric: She has a normal mood and affect. Her behavior is normal.   Nursing note and vitals reviewed.      Assessment/Plan   Diagnoses and all orders for this visit:    Essential hypertension  Comments:  stable - call if bp over 140/90    Vitamin D deficiency  Comments:  need daily vit d    Mood disorder  Comments:  stable    Elevated glucose    Chronic fatigue  Comments:  stable    Neck pain  Comments:  call if worse  Orders:  -     HYDROcodone-acetaminophen (NORCO)  MG per tablet; Take 1 tablet by mouth Every 6 (Six) Hours As Needed for Moderate Pain .    Pain  Comments:  need to use hydrocodone only for signif pain  Orders:  -     HYDROcodone-acetaminophen (NORCO)  MG per tablet; Take 1 tablet by mouth Every 6 (Six) Hours As Needed for Moderate Pain .  -     gabapentin (NEURONTIN) 800 MG tablet; Take 1 tablet by mouth Every Night.    Primary insomnia  Comments:  ok with nightly gabapentin (she states no side effects)  Orders:  -     gabapentin (NEURONTIN) 800 MG tablet; Take 1 tablet by mouth Every Night.    Chronic right shoulder pain  Comments:  keep appt with ortho - call if problems

## 2018-08-09 RX ORDER — LOSARTAN POTASSIUM AND HYDROCHLOROTHIAZIDE 12.5; 1 MG/1; MG/1
TABLET ORAL
Qty: 90 TABLET | Refills: 2 | Status: SHIPPED | OUTPATIENT
Start: 2018-08-09 | End: 2019-03-04 | Stop reason: SDUPTHER

## 2018-08-10 ENCOUNTER — TELEPHONE (OUTPATIENT)
Dept: INTERNAL MEDICINE | Facility: CLINIC | Age: 53
End: 2018-08-10

## 2018-08-10 RX ORDER — NITROFURANTOIN 25; 75 MG/1; MG/1
100 CAPSULE ORAL 2 TIMES DAILY
COMMUNITY
End: 2018-08-27

## 2018-08-10 NOTE — TELEPHONE ENCOUNTER
----- Message from Abeba Rushing sent at 8/10/2018  2:13 PM EDT -----  Contact: Harshil Jean.   Pharmacy is calling to get clarification on or to change    RX:Macro[facility]  Would like to change because back order.    Caller#[phone]

## 2018-08-27 ENCOUNTER — OFFICE VISIT (OUTPATIENT)
Dept: INTERNAL MEDICINE | Facility: CLINIC | Age: 53
End: 2018-08-27

## 2018-08-27 VITALS
TEMPERATURE: 97.6 F | WEIGHT: 125 LBS | SYSTOLIC BLOOD PRESSURE: 114 MMHG | BODY MASS INDEX: 21.29 KG/M2 | DIASTOLIC BLOOD PRESSURE: 78 MMHG

## 2018-08-27 DIAGNOSIS — N76.0 BACTERIAL VAGINOSIS: ICD-10-CM

## 2018-08-27 DIAGNOSIS — B96.89 BACTERIAL VAGINOSIS: ICD-10-CM

## 2018-08-27 DIAGNOSIS — R30.0 DYSURIA: Primary | ICD-10-CM

## 2018-08-27 DIAGNOSIS — N30.90 CYSTITIS: ICD-10-CM

## 2018-08-27 LAB
BACTERIA UR QL AUTO: ABNORMAL /HPF
BILIRUB UR QL STRIP: NEGATIVE
CLARITY UR: ABNORMAL
CLUE CELLS SPEC QL WET PREP: ABNORMAL
COLOR UR: YELLOW
GLUCOSE UR STRIP-MCNC: NEGATIVE MG/DL
HGB UR QL STRIP.AUTO: ABNORMAL
HYALINE CASTS UR QL AUTO: ABNORMAL /LPF
HYDATID CYST SPEC WET PREP: ABNORMAL
KETONES UR QL STRIP: NEGATIVE
KOH PREP NAIL: NORMAL
LEUKOCYTE ESTERASE UR QL STRIP.AUTO: ABNORMAL
MUCOUS THREADS URNS QL MICRO: ABNORMAL /HPF
NITRITE UR QL STRIP: POSITIVE
PH UR STRIP.AUTO: 6 [PH] (ref 5–8)
PROT UR QL STRIP: NEGATIVE
RBC # UR: ABNORMAL /HPF
REF LAB TEST METHOD: ABNORMAL
SP GR UR STRIP: 1.02 (ref 1–1.03)
SQUAMOUS #/AREA URNS HPF: ABNORMAL /HPF
T VAGINALIS SPEC QL WET PREP: ABNORMAL
UROBILINOGEN UR QL STRIP: ABNORMAL
WBC CLUMPS # UR AUTO: ABNORMAL /HPF
WBC SPEC QL WET PREP: ABNORMAL
WBC UR QL AUTO: ABNORMAL /HPF
YEAST GENITAL QL WET PREP: ABNORMAL

## 2018-08-27 PROCEDURE — 87220 TISSUE EXAM FOR FUNGI: CPT | Performed by: NURSE PRACTITIONER

## 2018-08-27 PROCEDURE — 87077 CULTURE AEROBIC IDENTIFY: CPT | Performed by: NURSE PRACTITIONER

## 2018-08-27 PROCEDURE — 87210 SMEAR WET MOUNT SALINE/INK: CPT | Performed by: NURSE PRACTITIONER

## 2018-08-27 PROCEDURE — 87186 SC STD MICRODIL/AGAR DIL: CPT | Performed by: NURSE PRACTITIONER

## 2018-08-27 PROCEDURE — 99213 OFFICE O/P EST LOW 20 MIN: CPT | Performed by: NURSE PRACTITIONER

## 2018-08-27 PROCEDURE — 81001 URINALYSIS AUTO W/SCOPE: CPT | Performed by: NURSE PRACTITIONER

## 2018-08-27 PROCEDURE — 87086 URINE CULTURE/COLONY COUNT: CPT | Performed by: NURSE PRACTITIONER

## 2018-08-27 RX ORDER — SULFAMETHOXAZOLE AND TRIMETHOPRIM 800; 160 MG/1; MG/1
1 TABLET ORAL 2 TIMES DAILY
Qty: 10 TABLET | Refills: 0 | Status: SHIPPED | OUTPATIENT
Start: 2018-08-27 | End: 2018-08-29 | Stop reason: ALTCHOICE

## 2018-08-27 RX ORDER — METRONIDAZOLE 7.5 MG/G
GEL VAGINAL NIGHTLY
Qty: 70 G | Refills: 0 | Status: SHIPPED | OUTPATIENT
Start: 2018-08-27 | End: 2018-09-01

## 2018-08-27 NOTE — PROGRESS NOTES
Subjective   Adela Morrison is a 52 y.o. female.     She was prescribed Macrobid via my chart on 8/10/2018 and completed course without much relief. She is current with keto diet. She did just start back with intercourse a couple months ago with a new partner.        Difficulty Urinating   This is a new problem. The current episode started more than 1 month ago. The problem has been gradually worsening. Associated symptoms include urinary symptoms (nocturia, burning, and urinary odor and fishy ). Pertinent negatives include no abdominal pain, change in bowel habit, chest pain, fever, nausea or vomiting. Treatments tried: macrobid and increased water intake  The treatment provided mild relief.        The following portions of the patient's history were reviewed and updated as appropriate: allergies, current medications, past social history and problem list.    Review of Systems   Constitutional: Negative for fever.   Cardiovascular: Negative for chest pain.   Gastrointestinal: Negative for abdominal pain, change in bowel habit, nausea and vomiting.   Genitourinary: Positive for difficulty urinating. Negative for frequency, hematuria and urgency.        Nocturia and fishy odor        Objective   Physical Exam   Constitutional: She is oriented to person, place, and time. She appears well-developed and well-nourished.   HENT:   Head: Normocephalic.   Nose: Nose normal.   Cardiovascular: Regular rhythm and normal heart sounds.  Exam reveals no S3 and no S4.    No murmur heard.  Pulmonary/Chest: Effort normal and breath sounds normal. She has no decreased breath sounds. She has no wheezes. She has no rhonchi. She has no rales.   Abdominal: Normal appearance and bowel sounds are normal. There is no tenderness. There is no CVA tenderness.   Genitourinary: There is no rash or tenderness on the right labia. There is no rash or tenderness on the left labia. No signs of injury around the vagina. Vaginal discharge (whitish gray  discharge and fishy odor ) found.   Musculoskeletal: She exhibits no edema.   Neurological: She is alert and oriented to person, place, and time. Gait normal.   Skin: Skin is warm and dry.   Psychiatric: She has a normal mood and affect.       Assessment/Plan   Adela was seen today for difficulty urinating.    Diagnoses and all orders for this visit:    Dysuria  -     Urinalysis With Microscopic If Indicated (No Culture) - Urine, Clean Catch; Future  -     Urinalysis With Microscopic If Indicated (No Culture) - Urine, Clean Catch  -     Urinalysis, Microscopic Only - Urine, Clean Catch; Future  -     Urinalysis, Microscopic Only - Urine, Clean Catch  -     Urine Culture - Urine, Urine, Clean Catch; Future  -     Urine Culture - Urine, Urine, Clean Catch    Cystitis  -     sulfamethoxazole-trimethoprim (BACTRIM DS,SEPTRA DS) 800-160 MG per tablet; Take 1 tablet by mouth 2 (Two) Times a Day for 5 days.    Bacterial vaginosis  -     Wet Prep, Genital - Swab, Vagina  -     KOH Prep - Swab, Vagina  -     Cancel: Chlamydia trachomatis, Neisseria gonorrhoeae, PCR - Urine, Vagina; Future  -     metroNIDAZOLE (METROGEL VAGINAL) 0.75 % vaginal gel; Insert  into the vagina Every Night for 5 days.  -     Chlamydia trachomatis, Neisseria gonorrhoeae, PCR - Urine, Urine, Clean Catch; Future  -     Chlamydia trachomatis, Neisseria gonorrhoeae, PCR - Urine, Urine, Clean Catch    Urine with bacteria, wbc, and nitrate. Sent for urine culture.   KOH/Wet prep with clue cells, bacteria and positive whiff test. She was advised no alcohol while on antibiotic.

## 2018-08-27 NOTE — PATIENT INSTRUCTIONS
Bacterial Vaginosis  Bacterial vaginosis is a vaginal infection that occurs when the normal balance of bacteria in the vagina is disrupted. It results from an overgrowth of certain bacteria. This is the most common vaginal infection among women ages 15-44.  Because bacterial vaginosis increases your risk for STIs (sexually transmitted infections), getting treated can help reduce your risk for chlamydia, gonorrhea, herpes, and HIV (human immunodeficiency virus). Treatment is also important for preventing complications in pregnant women, because this condition can cause an early (premature) delivery.  What are the causes?  This condition is caused by an increase in harmful bacteria that are normally present in small amounts in the vagina. However, the reason that the condition develops is not fully understood.  What increases the risk?  The following factors may make you more likely to develop this condition:  · Having a new sexual partner or multiple sexual partners.  · Having unprotected sex.  · Douching.  · Having an intrauterine device (IUD).  · Smoking.  · Drug and alcohol abuse.  · Taking certain antibiotic medicines.  · Being pregnant.    You cannot get bacterial vaginosis from toilet seats, bedding, swimming pools, or contact with objects around you.  What are the signs or symptoms?  Symptoms of this condition include:  · Grey or white vaginal discharge. The discharge can also be watery or foamy.  · A fish-like odor with discharge, especially after sexual intercourse or during menstruation.  · Itching in and around the vagina.  · Burning or pain with urination.    Some women with bacterial vaginosis have no signs or symptoms.  How is this diagnosed?  This condition is diagnosed based on:  · Your medical history.  · A physical exam of the vagina.  · Testing a sample of vaginal fluid under a microscope to look for a large amount of bad bacteria or abnormal cells. Your health care provider may use a cotton swab  or a small wooden spatula to collect the sample.    How is this treated?  This condition is treated with antibiotics. These may be given as a pill, a vaginal cream, or a medicine that is put into the vagina (suppository). If the condition comes back after treatment, a second round of antibiotics may be needed.  Follow these instructions at home:  Medicines  · Take over-the-counter and prescription medicines only as told by your health care provider.  · Take or use your antibiotic as told by your health care provider. Do not stop taking or using the antibiotic even if you start to feel better.  General instructions  · If you have a female sexual partner, tell her that you have a vaginal infection. She should see her health care provider and be treated if she has symptoms. If you have a male sexual partner, he does not need treatment.  · During treatment:  ? Avoid sexual activity until you finish treatment.  ? Do not douche.  ? Avoid alcohol as directed by your health care provider.  ? Avoid breastfeeding as directed by your health care provider.  · Drink enough water and fluids to keep your urine clear or pale yellow.  · Keep the area around your vagina and rectum clean.  ? Wash the area daily with warm water.  ? Wipe yourself from front to back after using the toilet.  · Keep all follow-up visits as told by your health care provider. This is important.  How is this prevented?  · Do not douche.  · Wash the outside of your vagina with warm water only.  · Use protection when having sex. This includes latex condoms and dental dams.  · Limit how many sexual partners you have. To help prevent bacterial vaginosis, it is best to have sex with just one partner (monogamous).  · Make sure you and your sexual partner are tested for STIs.  · Wear cotton or cotton-lined underwear.  · Avoid wearing tight pants and pantyhose, especially during summer.  · Limit the amount of alcohol that you drink.  · Do not use any products that  contain nicotine or tobacco, such as cigarettes and e-cigarettes. If you need help quitting, ask your health care provider.  · Do not use illegal drugs.  Where to find more information:  · Centers for Disease Control and Prevention: www.cdc.gov/std  · American Sexual Health Association (IGNACIO): www.ashastd.org  · U.[place]. Department of Health and Human Services, Office on Women's Health: www.womenshealth.gov/ or https://www.womenshealth.gov/a-z-topics/bacterial-vaginosis  Contact a health care provider if:  · Your symptoms do not improve, even after treatment.  · You have more discharge or pain when urinating.  · You have a fever.  · You have pain in your abdomen.  · You have pain during sex.  · You have vaginal bleeding between periods.  Summary  · Bacterial vaginosis is a vaginal infection that occurs when the normal balance of bacteria in the vagina is disrupted.  · Because bacterial vaginosis increases your risk for STIs (sexually transmitted infections), getting treated can help reduce your risk for chlamydia, gonorrhea, herpes, and HIV (human immunodeficiency virus). Treatment is also important for preventing complications in pregnant women, because the condition can cause an early (premature) delivery.  · This condition is treated with antibiotic medicines. These may be given as a pill, a vaginal cream, or a medicine that is put into the vagina (suppository).  This information is not intended to replace advice given to you by your health care provider. Make sure you discuss any questions you have with your health care provider.  Document Released: 12/18/2006 Document Revised: 04/23/2018 Document Reviewed: 09/02/2017  [email or web address] Interactive Patient Education © 2018 [email or web address]  Urinary Tract Infection, Adult  A urinary tract infection (UTI) is an infection of any part of the urinary tract, which includes the kidneys, ureters, bladder, and urethra. These organs make, store, and get rid of urine in the body. UTI can  be a bladder infection (cystitis) or kidney infection (pyelonephritis).  What are the causes?  This infection may be caused by fungi, viruses, or bacteria. Bacteria are the most common cause of UTIs. This condition can also be caused by repeated incomplete emptying of the bladder during urination.  What increases the risk?  This condition is more likely to develop if:  · You ignore your need to urinate or hold urine for long periods of time.  · You do not empty your bladder completely during urination.  · You wipe back to front after urinating or having a bowel movement, if you are female.  · You are uncircumcised, if you are male.  · You are constipated.  · You have a urinary catheter that stays in place (indwelling).  · You have a weak defense (immune) system.  · You have a medical condition that affects your bowels, kidneys, or bladder.  · You have diabetes.  · You take antibiotic medicines frequently or for long periods of time, and the antibiotics no longer work well against certain types of infections (antibiotic resistance).  · You take medicines that irritate your urinary tract.  · You are exposed to chemicals that irritate your urinary tract.  · You are female.    What are the signs or symptoms?  Symptoms of this condition include:  · Fever.  · Frequent urination or passing small amounts of urine frequently.  · Needing to urinate urgently.  · Pain or burning with urination.  · Urine that smells bad or unusual.  · Cloudy urine.  · Pain in the lower abdomen or back.  · Trouble urinating.  · Blood in the urine.  · Vomiting or being less hungry than normal.  · Diarrhea or abdominal pain.  · Vaginal discharge, if you are female.    How is this diagnosed?  This condition is diagnosed with a medical history and physical exam. You will also need to provide a urine sample to test your urine. Other tests may be done, including:  · Blood tests.  · Sexually transmitted disease (STD) testing.    If you have had more  than one UTI, a cystoscopy or imaging studies may be done to determine the cause of the infections.  How is this treated?  Treatment for this condition often includes a combination of two or more of the following:  · Antibiotic medicine.  · Other medicines to treat less common causes of UTI.  · Over-the-counter medicines to treat pain.  · Drinking enough water to stay hydrated.    Follow these instructions at home:  · Take over-the-counter and prescription medicines only as told by your health care provider.  · If you were prescribed an antibiotic, take it as told by your health care provider. Do not stop taking the antibiotic even if you start to feel better.  · Avoid alcohol, caffeine, tea, and carbonated beverages. They can irritate your bladder.  · Drink enough fluid to keep your urine clear or pale yellow.  · Keep all follow-up visits as told by your health care provider. This is important.  · Make sure to:  ? Empty your bladder often and completely. Do not hold urine for long periods of time.  ? Empty your bladder before and after sex.  ? Wipe from front to back after a bowel movement if you are female. Use each tissue one time when you wipe.  Contact a health care provider if:  · You have back pain.  · You have a fever.  · You feel nauseous or vomit.  · Your symptoms do not get better after 3 days.  · Your symptoms go away and then return.  Get help right away if:  · You have severe back pain or lower abdominal pain.  · You are vomiting and cannot keep down any medicines or water.  This information is not intended to replace advice given to you by your health care provider. Make sure you discuss any questions you have with your health care provider.  Document Released: 09/27/2006 Document Revised: 05/31/2017 Document Reviewed: 11/07/2016  [employer] Interactive Patient Education © 2018 [employer]

## 2018-08-28 LAB
C TRACH RRNA SPEC DONR QL NAA+PROBE: NEGATIVE
N GONORRHOEA DNA SPEC QL NAA+PROBE: NEGATIVE

## 2018-08-29 LAB — BACTERIA SPEC AEROBE CULT: ABNORMAL

## 2018-08-29 RX ORDER — NITROFURANTOIN 25; 75 MG/1; MG/1
100 CAPSULE ORAL EVERY 12 HOURS SCHEDULED
Qty: 10 CAPSULE | Refills: 0 | Status: SHIPPED | OUTPATIENT
Start: 2018-08-29 | End: 2018-09-03

## 2018-09-21 ENCOUNTER — APPOINTMENT (OUTPATIENT)
Dept: WOMENS IMAGING | Facility: HOSPITAL | Age: 53
End: 2018-09-21

## 2018-09-21 ENCOUNTER — OFFICE VISIT (OUTPATIENT)
Dept: INTERNAL MEDICINE | Facility: CLINIC | Age: 53
End: 2018-09-21

## 2018-09-21 VITALS
OXYGEN SATURATION: 98 % | SYSTOLIC BLOOD PRESSURE: 152 MMHG | WEIGHT: 127 LBS | HEART RATE: 71 BPM | HEIGHT: 64 IN | BODY MASS INDEX: 21.68 KG/M2 | DIASTOLIC BLOOD PRESSURE: 86 MMHG

## 2018-09-21 DIAGNOSIS — Z23 NEED FOR INFLUENZA VACCINATION: ICD-10-CM

## 2018-09-21 DIAGNOSIS — M79.672 LEFT FOOT PAIN: Primary | ICD-10-CM

## 2018-09-21 DIAGNOSIS — I10 ESSENTIAL HYPERTENSION: ICD-10-CM

## 2018-09-21 PROCEDURE — 90674 CCIIV4 VAC NO PRSV 0.5 ML IM: CPT | Performed by: NURSE PRACTITIONER

## 2018-09-21 PROCEDURE — 73630 X-RAY EXAM OF FOOT: CPT | Performed by: NURSE PRACTITIONER

## 2018-09-21 PROCEDURE — 99213 OFFICE O/P EST LOW 20 MIN: CPT | Performed by: NURSE PRACTITIONER

## 2018-09-21 PROCEDURE — 73630 X-RAY EXAM OF FOOT: CPT | Performed by: RADIOLOGY

## 2018-09-21 PROCEDURE — G0008 ADMIN INFLUENZA VIRUS VAC: HCPCS | Performed by: NURSE PRACTITIONER

## 2018-09-21 NOTE — PROGRESS NOTES
Subjective   Adela Morrison is a 52 y.o. female.     She reports about two weeks ago she went to get a treadmill out of vehicle and it fell on her foot.       Foot Injury    The incident occurred more than 1 week ago. The incident occurred at home. The injury mechanism was a direct blow. The pain is present in the left foot. The quality of the pain is described as aching. The pain is at a severity of 4/10. The pain is mild. The pain has been intermittent since onset. Associated symptoms include tingling (dorsal surface ). Pertinent negatives include no inability to bear weight or loss of motion. The symptoms are aggravated by weight bearing and movement. Treatments tried: ice, ibuprofen, elevation  The treatment provided moderate relief.        The following portions of the patient's history were reviewed and updated as appropriate: allergies, current medications, past social history and problem list.    Review of Systems   Constitutional: Negative for activity change, appetite change, fatigue and fever.   Respiratory: Negative for cough, shortness of breath and wheezing.    Cardiovascular: Negative for chest pain, palpitations and leg swelling.   Musculoskeletal: Positive for arthralgias (left foot ) and joint swelling.   Neurological: Positive for tingling (dorsal surface ).       Objective   Physical Exam   Constitutional: She is oriented to person, place, and time. She appears well-developed and well-nourished.   HENT:   Head: Normocephalic.   Nose: Nose normal.   Cardiovascular: Regular rhythm and normal heart sounds.  Exam reveals no S3 and no S4.    No murmur heard.  Repeat bp left arm 132/86   Pulmonary/Chest: Effort normal and breath sounds normal. She has no decreased breath sounds. She has no wheezes. She has no rhonchi. She has no rales.   Musculoskeletal: She exhibits no edema.        Right ankle: She exhibits normal range of motion and no swelling. No tenderness.        Left ankle: She exhibits normal  range of motion and no swelling. No tenderness.        Right foot: There is normal range of motion, no tenderness and no swelling.        Left foot: There is tenderness. There is normal range of motion and no swelling.   Tenderness on dorsal surface of foot  Healing scar noted         Neurological: She is alert and oriented to person, place, and time. Gait normal.   Skin: Skin is warm and dry.   Psychiatric: She has a normal mood and affect.       Assessment/Plan   Adela was seen today for foot injury.    Diagnoses and all orders for this visit:    Left foot pain  -     XR Foot 3+ View Left    Essential hypertension  Comments:  goal of bp less than 140/90; low salt diet     Need for influenza vaccination  -     Flucelvax Quad=>4Years (PFS)    Xray of foot with no acute findings, sent for final review. She will continue to monitor and return for further imaging if no relief of symptoms.

## 2018-09-21 NOTE — PATIENT INSTRUCTIONS
Foot Pain  Many things can cause foot pain. Some common causes are:  · An injury.  · A sprain.  · Arthritis.  · Blisters.  · Bunions.    Follow these instructions at home:  Pay attention to any changes in your symptoms. Take these actions to help with your discomfort:  · If directed, put ice on the affected area:  ? Put ice in a plastic bag.  ? Place a towel between your skin and the bag.  ? Leave the ice on for 15-20 minutes, 3?4 times a day for 2 days.  · Take over-the-counter and prescription medicines only as told by your health care provider.  · Wear comfortable, supportive shoes that fit you well. Do not wear high heels.  · Do not stand or walk for long periods of time.  · Do not lift a lot of weight. This can put added pressure on your feet.  · Do stretches to relieve foot pain and stiffness as told by your health care provider.  · Rub your foot gently.  · Keep your feet clean and dry.    Contact a health care provider if:  · Your pain does not get better after a few days of self-care.  · Your pain gets worse.  · You cannot stand on your foot.  Get help right away if:  · Your foot is numb or tingling.  · Your foot or toes are swollen.  · Your foot or toes turn white or blue.  · You have warmth and redness along your foot.  This information is not intended to replace advice given to you by your health care provider. Make sure you discuss any questions you have with your health care provider.  Document Released: 01/13/2017 Document Revised: 05/25/2017 Document Reviewed: 01/13/2016  [employer] Interactive Patient Education © 2018 [employer]

## 2018-10-04 DIAGNOSIS — Z12.31 ENCOUNTER FOR SCREENING MAMMOGRAM FOR BREAST CANCER: Primary | ICD-10-CM

## 2018-10-05 ENCOUNTER — OFFICE VISIT (OUTPATIENT)
Dept: INTERNAL MEDICINE | Facility: CLINIC | Age: 53
End: 2018-10-05

## 2018-10-05 ENCOUNTER — APPOINTMENT (OUTPATIENT)
Dept: WOMENS IMAGING | Facility: HOSPITAL | Age: 53
End: 2018-10-05

## 2018-10-05 VITALS
SYSTOLIC BLOOD PRESSURE: 138 MMHG | WEIGHT: 130.2 LBS | HEIGHT: 64 IN | DIASTOLIC BLOOD PRESSURE: 88 MMHG | BODY MASS INDEX: 22.23 KG/M2

## 2018-10-05 DIAGNOSIS — R73.09 ELEVATED GLUCOSE: ICD-10-CM

## 2018-10-05 DIAGNOSIS — R52 PAIN: ICD-10-CM

## 2018-10-05 DIAGNOSIS — Z78.0 MENOPAUSE: ICD-10-CM

## 2018-10-05 DIAGNOSIS — E55.9 VITAMIN D DEFICIENCY: ICD-10-CM

## 2018-10-05 DIAGNOSIS — F51.01 PRIMARY INSOMNIA: ICD-10-CM

## 2018-10-05 DIAGNOSIS — J45.20 MILD INTERMITTENT ASTHMA WITHOUT COMPLICATION: ICD-10-CM

## 2018-10-05 DIAGNOSIS — I10 ESSENTIAL HYPERTENSION: Primary | ICD-10-CM

## 2018-10-05 DIAGNOSIS — G89.4 CHRONIC PAIN SYNDROME: ICD-10-CM

## 2018-10-05 DIAGNOSIS — F39 MOOD DISORDER (HCC): ICD-10-CM

## 2018-10-05 DIAGNOSIS — Z12.31 ENCOUNTER FOR SCREENING MAMMOGRAM FOR BREAST CANCER: ICD-10-CM

## 2018-10-05 DIAGNOSIS — F41.9 ANXIETY: ICD-10-CM

## 2018-10-05 DIAGNOSIS — M54.2 NECK PAIN: ICD-10-CM

## 2018-10-05 DIAGNOSIS — E78.49 OTHER HYPERLIPIDEMIA: ICD-10-CM

## 2018-10-05 LAB
25(OH)D3 SERPL-MCNC: 55 NG/ML (ref 30–100)
ALBUMIN SERPL-MCNC: 4.2 G/DL (ref 3.5–5.2)
ALBUMIN/GLOB SERPL: 1.6 G/DL
ALP SERPL-CCNC: 53 U/L (ref 39–117)
ALT SERPL W P-5'-P-CCNC: 14 U/L (ref 1–33)
ANION GAP SERPL CALCULATED.3IONS-SCNC: 12.2 MMOL/L
AST SERPL-CCNC: 16 U/L (ref 1–32)
BASOPHILS # BLD AUTO: 0.03 10*3/MM3 (ref 0–0.2)
BASOPHILS NFR BLD AUTO: 0.6 % (ref 0–2)
BILIRUB SERPL-MCNC: 0.8 MG/DL (ref 0.1–1.2)
BUN BLD-MCNC: 9 MG/DL (ref 6–20)
BUN/CREAT SERPL: 13.2 (ref 7–25)
CALCIUM SPEC-SCNC: 9 MG/DL (ref 8.6–10.5)
CHLORIDE SERPL-SCNC: 100 MMOL/L (ref 98–107)
CHOLEST SERPL-MCNC: 203 MG/DL (ref 0–200)
CO2 SERPL-SCNC: 28.8 MMOL/L (ref 22–29)
CREAT BLD-MCNC: 0.68 MG/DL (ref 0.57–1)
DEPRECATED RDW RBC AUTO: 45.1 FL (ref 37–54)
EOSINOPHIL # BLD AUTO: 0.34 10*3/MM3 (ref 0–0.7)
EOSINOPHIL NFR BLD AUTO: 7.2 % (ref 0–5)
ERYTHROCYTE [DISTWIDTH] IN BLOOD BY AUTOMATED COUNT: 12.7 % (ref 11.5–15)
GFR SERPL CREATININE-BSD FRML MDRD: 91 ML/MIN/1.73
GLOBULIN UR ELPH-MCNC: 2.7 GM/DL
GLUCOSE BLD-MCNC: 94 MG/DL (ref 65–99)
HCT VFR BLD AUTO: 40.2 % (ref 34.1–44.9)
HDLC SERPL-MCNC: 87 MG/DL (ref 40–60)
HGB BLD-MCNC: 12.9 G/DL (ref 11.2–15.7)
LDLC SERPL CALC-MCNC: 106 MG/DL (ref 0–100)
LDLC/HDLC SERPL: 1.22 {RATIO}
LYMPHOCYTES # BLD AUTO: 1.68 10*3/MM3 (ref 0.8–7)
LYMPHOCYTES NFR BLD AUTO: 35.8 % (ref 10–60)
MCH RBC QN AUTO: 32 PG (ref 26–34)
MCHC RBC AUTO-ENTMCNC: 32.1 G/DL (ref 31–37)
MCV RBC AUTO: 99.8 FL (ref 80–100)
MONOCYTES # BLD AUTO: 0.34 10*3/MM3 (ref 0–1)
MONOCYTES NFR BLD AUTO: 7.2 % (ref 0–13)
NEUTROPHILS # BLD AUTO: 2.3 10*3/MM3 (ref 1–11)
NEUTROPHILS NFR BLD AUTO: 49.2 % (ref 30–85)
PLATELET # BLD AUTO: 197 10*3/MM3 (ref 150–450)
PMV BLD AUTO: 10.4 FL (ref 6–12)
POTASSIUM BLD-SCNC: 4.3 MMOL/L (ref 3.5–5.2)
PROT SERPL-MCNC: 6.9 G/DL (ref 6–8.5)
RBC # BLD AUTO: 4.03 10*6/MM3 (ref 3.93–5.22)
SODIUM BLD-SCNC: 141 MMOL/L (ref 136–145)
TRIGL SERPL-MCNC: 50 MG/DL (ref 0–150)
VLDLC SERPL-MCNC: 10 MG/DL (ref 5–40)
WBC NRBC COR # BLD: 4.69 10*3/MM3 (ref 5–10)

## 2018-10-05 PROCEDURE — 77067 SCR MAMMO BI INCL CAD: CPT | Performed by: RADIOLOGY

## 2018-10-05 PROCEDURE — 77080 DXA BONE DENSITY AXIAL: CPT | Performed by: INTERNAL MEDICINE

## 2018-10-05 PROCEDURE — 82306 VITAMIN D 25 HYDROXY: CPT | Performed by: INTERNAL MEDICINE

## 2018-10-05 PROCEDURE — 99213 OFFICE O/P EST LOW 20 MIN: CPT | Performed by: INTERNAL MEDICINE

## 2018-10-05 PROCEDURE — 85025 COMPLETE CBC W/AUTO DIFF WBC: CPT | Performed by: INTERNAL MEDICINE

## 2018-10-05 PROCEDURE — 36415 COLL VENOUS BLD VENIPUNCTURE: CPT | Performed by: INTERNAL MEDICINE

## 2018-10-05 PROCEDURE — 80053 COMPREHEN METABOLIC PANEL: CPT | Performed by: INTERNAL MEDICINE

## 2018-10-05 PROCEDURE — 77067 SCR MAMMO BI INCL CAD: CPT | Performed by: INTERNAL MEDICINE

## 2018-10-05 PROCEDURE — 80061 LIPID PANEL: CPT | Performed by: INTERNAL MEDICINE

## 2018-10-05 RX ORDER — ESTRADIOL 0.1 MG/G
2 CREAM VAGINAL 2 TIMES WEEKLY
Qty: 1 EACH | Refills: 6 | Status: SHIPPED | OUTPATIENT
Start: 2018-10-08 | End: 2019-06-14 | Stop reason: SDUPTHER

## 2018-10-05 RX ORDER — ESCITALOPRAM OXALATE 20 MG/1
20 TABLET ORAL DAILY
Qty: 90 TABLET | Refills: 2 | Status: SHIPPED | OUTPATIENT
Start: 2018-10-05 | End: 2019-03-04 | Stop reason: SDUPTHER

## 2018-10-05 RX ORDER — HYDROCODONE BITARTRATE AND ACETAMINOPHEN 10; 325 MG/1; MG/1
1 TABLET ORAL EVERY 6 HOURS PRN
Qty: 60 TABLET | Refills: 0 | Status: SHIPPED | OUTPATIENT
Start: 2018-10-05 | End: 2019-03-04 | Stop reason: SDUPTHER

## 2018-10-05 RX ORDER — MONTELUKAST SODIUM 10 MG/1
10 TABLET ORAL NIGHTLY
Qty: 90 TABLET | Refills: 3 | Status: SHIPPED | OUTPATIENT
Start: 2018-10-05 | End: 2020-07-24

## 2018-10-05 RX ORDER — LORAZEPAM 1 MG/1
1 TABLET ORAL EVERY 8 HOURS PRN
Qty: 50 TABLET | Refills: 1 | Status: SHIPPED | OUTPATIENT
Start: 2018-10-05 | End: 2019-06-14 | Stop reason: SDUPTHER

## 2018-10-05 RX ORDER — GABAPENTIN 800 MG/1
800 TABLET ORAL NIGHTLY
Qty: 90 TABLET | Refills: 1 | Status: SHIPPED | OUTPATIENT
Start: 2018-10-05 | End: 2019-03-04 | Stop reason: SDUPTHER

## 2018-10-05 NOTE — PROGRESS NOTES
"Geena Morrison is a 52 y.o. female here for   Chief Complaint   Patient presents with   • Anxiety     4 month follow-up   • Hypertension   • Pain   .    Vitals:    10/05/18 0846   BP: 138/88   BP Location: Left arm   Patient Position: Sitting   Cuff Size: Adult   Weight: 59.1 kg (130 lb 3.2 oz)   Height: 163.2 cm (64.25\")       Body mass index is 22.18 kg/m².    Anxiety   Presents for follow-up visit. Symptoms include excessive worry and nervous/anxious behavior (ok with ativan 1-3x monthly). Patient reports no chest pain, palpitations or shortness of breath. Symptoms occur occasionally. The quality of sleep is good.       Hypertension   This is a chronic problem. The current episode started more than 1 year ago. The problem is unchanged. The problem is controlled. Associated symptoms include anxiety and neck pain (ok with hydrocodone 10-20/month). Pertinent negatives include no chest pain, palpitations or shortness of breath.   Pain   This is a recurrent problem. The current episode started more than 1 year ago. The problem occurs intermittently. The problem has been unchanged. Associated symptoms include fatigue and neck pain (ok with hydrocodone 10-20/month). Pertinent negatives include no chest pain, chills, coughing or fever.        The following portions of the patient's history were reviewed and updated as appropriate: allergies, current medications, past social history and problem list.    Review of Systems   Constitutional: Positive for fatigue. Negative for chills and fever.   Respiratory: Negative for cough, shortness of breath and wheezing.    Cardiovascular: Negative for chest pain, palpitations and leg swelling.   Musculoskeletal: Positive for neck pain (ok with hydrocodone 10-20/month).   Psychiatric/Behavioral: Positive for sleep disturbance (ok with gabapentin qhs). Negative for dysphoric mood. The patient is nervous/anxious (ok with ativan 1-3x monthly).        Objective   Physical Exam "   Constitutional: She appears well-developed and well-nourished. No distress.   Cardiovascular: Normal rate, regular rhythm and normal heart sounds.    Pulmonary/Chest: No respiratory distress. She has no wheezes. She has no rales. She exhibits no tenderness.   Musculoskeletal: She exhibits no edema.   Psychiatric: She has a normal mood and affect. Her behavior is normal.   Nursing note and vitals reviewed.    Gait normal.    Assessment/Plan   Diagnoses and all orders for this visit:    Essential hypertension  Comments:  controlled with med - call if bp over 130/80  Orders:  -     CBC Auto Differential; Future  -     Comprehensive Metabolic Panel; Future  -     Lipid Panel; Future    Mood disorder (CMS/HCC)  Comments:  stable - continue ativan sparingly (not daily)  Orders:  -     escitalopram (LEXAPRO) 20 MG tablet; Take 1 tablet by mouth Daily.  -     LORazepam (ATIVAN) 1 MG tablet; Take 1 tablet by mouth Every 8 (Eight) Hours As Needed for Anxiety.    Mild intermittent asthma without complication  Comments:  call if sx  Orders:  -     montelukast (SINGULAIR) 10 MG tablet; Take 1 tablet by mouth Every Night.    Neck pain  Comments:  call if worse  Orders:  -     HYDROcodone-acetaminophen (NORCO)  MG per tablet; Take 1 tablet by mouth Every 6 (Six) Hours As Needed for Moderate Pain .    Pain  Comments:  need to use hydrocodone only for signif pain  Orders:  -     HYDROcodone-acetaminophen (NORCO)  MG per tablet; Take 1 tablet by mouth Every 6 (Six) Hours As Needed for Moderate Pain .  -     gabapentin (NEURONTIN) 800 MG tablet; Take 1 tablet by mouth Every Night.    Primary insomnia  Comments:  ok with nightly gabapentin (she states no side effects)  Orders:  -     gabapentin (NEURONTIN) 800 MG tablet; Take 1 tablet by mouth Every Night.    Menopause  -     DEXA Bone Density Axial; Future    Vitamin D deficiency  Comments:  continue 3,000 units/d  Orders:  -     Vitamin D 25 Hydroxy; Future    Chronic  pain syndrome  Comments:  ok with occ hydrocodone - call if worse    Elevated glucose  Comments:  continue diet/ex    Anxiety  Comments:  controlled with occ ativan - call if worse    Other hyperlipidemia  -     Lipid Panel; Future    Other orders  -     ESTRACE VAGINAL 0.1 MG/GM vaginal cream; Insert 2 g into the vagina 2 (Two) Times a Week.       Discussed need to wean off estrogen b/c it may incr risk of breast cancer.

## 2018-10-09 ENCOUNTER — TELEPHONE (OUTPATIENT)
Dept: INTERNAL MEDICINE | Facility: CLINIC | Age: 53
End: 2018-10-09

## 2019-03-04 DIAGNOSIS — R52 PAIN: ICD-10-CM

## 2019-03-04 DIAGNOSIS — F39 MOOD DISORDER (HCC): ICD-10-CM

## 2019-03-04 DIAGNOSIS — F51.01 PRIMARY INSOMNIA: ICD-10-CM

## 2019-03-04 DIAGNOSIS — M54.2 NECK PAIN: ICD-10-CM

## 2019-03-04 RX ORDER — ESCITALOPRAM OXALATE 20 MG/1
20 TABLET ORAL DAILY
Qty: 90 TABLET | Refills: 2 | Status: SHIPPED | OUTPATIENT
Start: 2019-03-04 | End: 2019-06-14 | Stop reason: SDUPTHER

## 2019-03-04 RX ORDER — LOSARTAN POTASSIUM AND HYDROCHLOROTHIAZIDE 12.5; 1 MG/1; MG/1
1 TABLET ORAL DAILY
Qty: 90 TABLET | Refills: 2 | Status: SHIPPED | OUTPATIENT
Start: 2019-03-04 | End: 2019-06-14 | Stop reason: SDUPTHER

## 2019-03-04 RX ORDER — GABAPENTIN 800 MG/1
800 TABLET ORAL NIGHTLY
Qty: 90 TABLET | Refills: 1 | Status: SHIPPED | OUTPATIENT
Start: 2019-03-04 | End: 2019-06-14 | Stop reason: SDUPTHER

## 2019-03-04 RX ORDER — HYDROCODONE BITARTRATE AND ACETAMINOPHEN 10; 325 MG/1; MG/1
1 TABLET ORAL EVERY 6 HOURS PRN
Qty: 60 TABLET | Refills: 0 | Status: SHIPPED | OUTPATIENT
Start: 2019-03-04 | End: 2019-06-14 | Stop reason: SDUPTHER

## 2019-04-30 ENCOUNTER — OFFICE VISIT (OUTPATIENT)
Dept: INTERNAL MEDICINE | Facility: CLINIC | Age: 54
End: 2019-04-30

## 2019-04-30 VITALS
WEIGHT: 131 LBS | DIASTOLIC BLOOD PRESSURE: 100 MMHG | TEMPERATURE: 97.9 F | SYSTOLIC BLOOD PRESSURE: 140 MMHG | OXYGEN SATURATION: 97 % | HEIGHT: 64 IN | HEART RATE: 58 BPM | BODY MASS INDEX: 22.36 KG/M2

## 2019-04-30 DIAGNOSIS — N30.90 CYSTITIS: ICD-10-CM

## 2019-04-30 DIAGNOSIS — I10 ESSENTIAL HYPERTENSION: ICD-10-CM

## 2019-04-30 DIAGNOSIS — R10.9 FLANK PAIN: Primary | ICD-10-CM

## 2019-04-30 LAB
BACTERIA UR QL AUTO: ABNORMAL /HPF
BILIRUB UR QL STRIP: NEGATIVE
CLARITY UR: ABNORMAL
COLOR UR: YELLOW
GLUCOSE UR STRIP-MCNC: NEGATIVE MG/DL
HGB UR QL STRIP.AUTO: NEGATIVE
HYALINE CASTS UR QL AUTO: ABNORMAL /LPF
KETONES UR QL STRIP: NEGATIVE
LEUKOCYTE ESTERASE UR QL STRIP.AUTO: ABNORMAL
MUCOUS THREADS URNS QL MICRO: ABNORMAL /HPF
NITRITE UR QL STRIP: POSITIVE
PH UR STRIP.AUTO: 6.5 [PH] (ref 5–8)
PROT UR QL STRIP: NEGATIVE
RBC # UR: ABNORMAL /HPF
REF LAB TEST METHOD: ABNORMAL
SP GR UR STRIP: 1.01 (ref 1–1.03)
SQUAMOUS #/AREA URNS HPF: ABNORMAL /HPF
UROBILINOGEN UR QL STRIP: ABNORMAL
WBC CLUMPS # UR AUTO: ABNORMAL /HPF
WBC UR QL AUTO: ABNORMAL /HPF

## 2019-04-30 PROCEDURE — 99213 OFFICE O/P EST LOW 20 MIN: CPT | Performed by: NURSE PRACTITIONER

## 2019-04-30 PROCEDURE — 87186 SC STD MICRODIL/AGAR DIL: CPT | Performed by: NURSE PRACTITIONER

## 2019-04-30 PROCEDURE — 87086 URINE CULTURE/COLONY COUNT: CPT | Performed by: NURSE PRACTITIONER

## 2019-04-30 PROCEDURE — 81001 URINALYSIS AUTO W/SCOPE: CPT | Performed by: NURSE PRACTITIONER

## 2019-04-30 PROCEDURE — 87077 CULTURE AEROBIC IDENTIFY: CPT | Performed by: NURSE PRACTITIONER

## 2019-04-30 RX ORDER — NITROFURANTOIN 25; 75 MG/1; MG/1
100 CAPSULE ORAL 2 TIMES DAILY
Qty: 14 CAPSULE | Refills: 0 | Status: SHIPPED | OUTPATIENT
Start: 2019-04-30 | End: 2019-05-07

## 2019-04-30 NOTE — PATIENT INSTRUCTIONS
Urinary Tract Infection, Adult  A urinary tract infection (UTI) is an infection of any part of the urinary tract. The urinary tract includes the:  · Kidneys.  · Ureters.  · Bladder.  · Urethra.    These organs make, store, and get rid of pee (urine) in the body.  Follow these instructions at home:  · Take over-the-counter and prescription medicines only as told by your doctor.  · If you were prescribed an antibiotic medicine, take it as told by your doctor. Do not stop taking the antibiotic even if you start to feel better.  · Avoid the following drinks:  ? Alcohol.  ? Caffeine.  ? Tea.  ? Carbonated drinks.  · Drink enough fluid to keep your pee clear or pale yellow.  · Keep all follow-up visits as told by your doctor. This is important.  · Make sure to:  ? Empty your bladder often and completely. Do not to hold pee for long periods of time.  ? Empty your bladder before and after sex.  ? Wipe from front to back after a bowel movement if you are female. Use each tissue one time when you wipe.  Contact a doctor if:  · You have back pain.  · You have a fever.  · You feel sick to your stomach (nauseous).  · You throw up (vomit).  · Your symptoms do not get better after 3 days.  · Your symptoms go away and then come back.  Get help right away if:  · You have very bad back pain.  · You have very bad lower belly (abdominal) pain.  · You are throwing up and cannot keep down any medicines or water.  This information is not intended to replace advice given to you by your health care provider. Make sure you discuss any questions you have with your health care provider.  Document Released: 06/05/2009 Document Revised: 06/12/2018 Document Reviewed: 11/07/2016  [employer] Interactive Patient Education © 2019 [employer]

## 2019-04-30 NOTE — PROGRESS NOTES
Subjective   Adela Morrison is a 53 y.o. female.     She reports in the last couple months she has noted a urinary odor. She is sexual active with new partner. No history of kidney stones.       Flank Pain   This is a new problem. The current episode started in the past 7 days. The problem is unchanged. Pain location: right flank  The quality of the pain is described as aching. The pain does not radiate. The pain is at a severity of 2/10. The pain is mild. Pertinent negatives include no abdominal pain, bladder incontinence, bowel incontinence, chest pain, dysuria, fever, leg pain, numbness, pelvic pain or tingling. (Urinary odor   Nocturia ) Treatments tried: drinking plenty of water  The treatment provided mild relief.        The following portions of the patient's history were reviewed and updated as appropriate: allergies, current medications, past social history and problem list.    Review of Systems   Constitutional: Negative for activity change, appetite change, fatigue and fever.   Respiratory: Negative for cough, shortness of breath and wheezing.    Cardiovascular: Negative for chest pain, palpitations and leg swelling.   Gastrointestinal: Negative for abdominal pain and bowel incontinence.   Genitourinary: Positive for flank pain. Negative for bladder incontinence, decreased urine volume, difficulty urinating, dysuria, frequency, hematuria, pelvic pain, urgency, vaginal bleeding, vaginal discharge and vaginal pain.        Urinary odor   Nocturia    Neurological: Negative for tingling and numbness.       Objective   Physical Exam   Constitutional: She is oriented to person, place, and time. She appears well-developed and well-nourished.   HENT:   Head: Normocephalic.   Nose: Nose normal.   Cardiovascular: Regular rhythm and normal heart sounds. Exam reveals no S3 and no S4.   No murmur heard.  Repeat bp left arm 132/88  No pedal edema   Pulmonary/Chest: Effort normal and breath sounds normal. She has no  decreased breath sounds. She has no wheezes. She has no rhonchi. She has no rales.   Abdominal: Normal appearance and bowel sounds are normal. There is no tenderness. There is no CVA tenderness.   Musculoskeletal: She exhibits no edema.   Neurological: She is alert and oriented to person, place, and time. Gait normal.   Skin: Skin is warm and dry.   Psychiatric: She has a normal mood and affect.       Assessment/Plan   Adela was seen today for flank pain.    Diagnoses and all orders for this visit:    Flank pain  -     Urinalysis With Microscopic If Indicated (No Culture) - Urine, Clean Catch; Future  -     Urinalysis With Microscopic If Indicated (No Culture) - Urine, Clean Catch  -     Urinalysis, Microscopic Only - Urine, Clean Catch; Future  -     Urinalysis, Microscopic Only - Urine, Clean Catch    Cystitis  Comments:  drink plenty of water; push fluids   Orders:  -     nitrofurantoin, macrocrystal-monohydrate, (MACROBID) 100 MG capsule; Take 1 capsule by mouth 2 (Two) Times a Day for 7 days.  -     Urine Culture - Urine, Urine, Clean Catch; Future  -     Urine Culture - Urine, Urine, Clean Catch    Essential hypertension  Comments:  recheck normal; needs monitor blood pressure       Urine with wbc, bacteria and nitrate. Sent for culture will adjust care if needed.   She requested ambien for sleep, instead of gabapentin. I explained that she will need to discuss with Dr Huston at upcoming appointment (acute visit today).

## 2019-05-02 LAB — BACTERIA SPEC AEROBE CULT: ABNORMAL

## 2019-05-09 DIAGNOSIS — N30.90 CYSTITIS: Primary | ICD-10-CM

## 2019-05-10 ENCOUNTER — LAB (OUTPATIENT)
Dept: INTERNAL MEDICINE | Facility: CLINIC | Age: 54
End: 2019-05-10

## 2019-05-10 DIAGNOSIS — N30.90 CYSTITIS: ICD-10-CM

## 2019-05-10 LAB
BACTERIA UR QL AUTO: ABNORMAL /HPF
BILIRUB UR QL STRIP: NEGATIVE
CLARITY UR: CLEAR
COLOR UR: YELLOW
GLUCOSE UR STRIP-MCNC: NEGATIVE MG/DL
HGB UR QL STRIP.AUTO: NEGATIVE
HYALINE CASTS UR QL AUTO: ABNORMAL /LPF
KETONES UR QL STRIP: NEGATIVE
LEUKOCYTE ESTERASE UR QL STRIP.AUTO: ABNORMAL
NITRITE UR QL STRIP: NEGATIVE
PH UR STRIP.AUTO: 7.5 [PH] (ref 5–8)
PROT UR QL STRIP: NEGATIVE
RBC # UR: ABNORMAL /HPF
REF LAB TEST METHOD: ABNORMAL
SP GR UR STRIP: 1.01 (ref 1–1.03)
SQUAMOUS #/AREA URNS HPF: ABNORMAL /HPF
UROBILINOGEN UR QL STRIP: ABNORMAL
WBC UR QL AUTO: ABNORMAL /HPF

## 2019-05-10 PROCEDURE — 81001 URINALYSIS AUTO W/SCOPE: CPT | Performed by: NURSE PRACTITIONER

## 2019-05-13 ENCOUNTER — TELEPHONE (OUTPATIENT)
Dept: INTERNAL MEDICINE | Facility: CLINIC | Age: 54
End: 2019-05-13

## 2019-05-13 DIAGNOSIS — N30.90 CYSTITIS: Primary | ICD-10-CM

## 2019-05-13 NOTE — TELEPHONE ENCOUNTER
Regarding: FW: Test Results Question  Contact: [phone]      ----- Message -----  From: Adela Morrison  Sent: 5/13/2019   4:00 PM  To: Ania Harris [facility] [place]  Subject: Test Results Question                            ----- Message from Mychart, Generic sent at 5/13/2019  4:00 PM EDT -----    I left you a urine specimen on Friday and I see that my test results came back yesterday positive for bacteria again.  Can you please let me know what the next step is?  This foul urine smell is embarrassing.    Thanks,  Adela

## 2019-05-13 NOTE — TELEPHONE ENCOUNTER
I called patient and informed her that urine was not sent for culture,although bacteria noted. She will come in am and will send urine for culture. Will hold on treatment. She is having odor only.

## 2019-05-14 ENCOUNTER — LAB (OUTPATIENT)
Dept: INTERNAL MEDICINE | Facility: CLINIC | Age: 54
End: 2019-05-14

## 2019-05-14 DIAGNOSIS — N30.90 CYSTITIS: ICD-10-CM

## 2019-05-14 PROCEDURE — 87086 URINE CULTURE/COLONY COUNT: CPT | Performed by: NURSE PRACTITIONER

## 2019-05-14 PROCEDURE — 87186 SC STD MICRODIL/AGAR DIL: CPT | Performed by: NURSE PRACTITIONER

## 2019-05-14 PROCEDURE — 87077 CULTURE AEROBIC IDENTIFY: CPT | Performed by: NURSE PRACTITIONER

## 2019-05-16 DIAGNOSIS — N30.90 CYSTITIS: Primary | ICD-10-CM

## 2019-05-16 LAB — BACTERIA SPEC AEROBE CULT: ABNORMAL

## 2019-05-16 RX ORDER — NITROFURANTOIN 25; 75 MG/1; MG/1
100 CAPSULE ORAL 2 TIMES DAILY
Qty: 14 CAPSULE | Refills: 0 | Status: SHIPPED | OUTPATIENT
Start: 2019-05-16 | End: 2019-05-23

## 2019-05-22 RX ORDER — ESTRADIOL 0.05 MG/D
PATCH TRANSDERMAL
Qty: 12 PATCH | Refills: 2 | Status: SHIPPED | OUTPATIENT
Start: 2019-05-22 | End: 2019-06-14

## 2019-06-07 ENCOUNTER — PATIENT MESSAGE (OUTPATIENT)
Dept: INTERNAL MEDICINE | Facility: CLINIC | Age: 54
End: 2019-06-07

## 2019-06-07 NOTE — TELEPHONE ENCOUNTER
From: Adela Morrison  To: Sharmaine Ward APRN  Sent: 6/7/2019 12:44 PM EDT  Subject: Non-Urgent Medical Question    Sharmaine I’ve got the bladder infection again. Strong urine odor again. This is the third time. Is there a stronger antibiotic you can give me to knock this?

## 2019-06-10 ENCOUNTER — OFFICE VISIT (OUTPATIENT)
Dept: INTERNAL MEDICINE | Facility: CLINIC | Age: 54
End: 2019-06-10

## 2019-06-10 ENCOUNTER — TELEPHONE (OUTPATIENT)
Dept: INTERNAL MEDICINE | Facility: CLINIC | Age: 54
End: 2019-06-10

## 2019-06-10 VITALS
HEIGHT: 64 IN | OXYGEN SATURATION: 100 % | WEIGHT: 135.4 LBS | TEMPERATURE: 97.5 F | HEART RATE: 60 BPM | BODY MASS INDEX: 23.12 KG/M2 | SYSTOLIC BLOOD PRESSURE: 132 MMHG | DIASTOLIC BLOOD PRESSURE: 64 MMHG

## 2019-06-10 DIAGNOSIS — R30.0 DYSURIA: Primary | ICD-10-CM

## 2019-06-10 LAB
BACTERIA UR QL AUTO: ABNORMAL /HPF
BILIRUB UR QL STRIP: NEGATIVE
CLARITY UR: CLEAR
COLOR UR: YELLOW
GLUCOSE UR STRIP-MCNC: NEGATIVE MG/DL
HGB UR QL STRIP.AUTO: NEGATIVE
HYALINE CASTS UR QL AUTO: ABNORMAL /LPF
KETONES UR QL STRIP: NEGATIVE
LEUKOCYTE ESTERASE UR QL STRIP.AUTO: ABNORMAL
NITRITE UR QL STRIP: NEGATIVE
PH UR STRIP.AUTO: 7 [PH] (ref 5–8)
PROT UR QL STRIP: NEGATIVE
RBC # UR: ABNORMAL /HPF
REF LAB TEST METHOD: ABNORMAL
SP GR UR STRIP: 1.01 (ref 1–1.03)
SQUAMOUS #/AREA URNS HPF: ABNORMAL /HPF
UROBILINOGEN UR QL STRIP: ABNORMAL
WBC UR QL AUTO: ABNORMAL /HPF

## 2019-06-10 PROCEDURE — 81001 URINALYSIS AUTO W/SCOPE: CPT | Performed by: NURSE PRACTITIONER

## 2019-06-10 PROCEDURE — 87077 CULTURE AEROBIC IDENTIFY: CPT | Performed by: NURSE PRACTITIONER

## 2019-06-10 PROCEDURE — 87086 URINE CULTURE/COLONY COUNT: CPT | Performed by: NURSE PRACTITIONER

## 2019-06-10 PROCEDURE — 87186 SC STD MICRODIL/AGAR DIL: CPT | Performed by: NURSE PRACTITIONER

## 2019-06-10 PROCEDURE — 99213 OFFICE O/P EST LOW 20 MIN: CPT | Performed by: NURSE PRACTITIONER

## 2019-06-10 RX ORDER — CEPHALEXIN 500 MG/1
500 CAPSULE ORAL 2 TIMES DAILY
Qty: 14 CAPSULE | Refills: 0 | Status: SHIPPED | OUTPATIENT
Start: 2019-06-10 | End: 2019-06-17

## 2019-06-10 NOTE — TELEPHONE ENCOUNTER
Regarding: FW: Non-Urgent Medical Question  Contact: [phone]  Please ask patient to come to clinic for OV and urine collection. Thanks     Sharmaine CHIRINOS   ----- Message -----  From: Julita Henry MA  Sent: 6/7/2019  12:53 PM  To: CANDIS Palma  Subject: Non-Urgent Medical Question                      ----- Message from Julita Henry MA sent at 6/7/2019 12:53 PM EDT -----       ----- Message from Adela Morrison to Sharmaine Ward APRN sent at 6/7/2019 12:44 PM -----   Sharmaine I’ve got the bladder infection again.  Strong urine odor again.  This is the third time.  Is there a stronger antibiotic you can give me to knock this?

## 2019-06-10 NOTE — PROGRESS NOTES
"Geena Morrison is a 53 y.o. female.     She has new sexual partner (no condom use). She reports about one week ago she started with strong urinary odor and burning. Her symptoms have remained the same without improvement. She was treated 4/30/19 and 5/16/2019 for similar symptoms with macrobid, which improved symptoms.       Difficulty Urinating   This is a new problem. The current episode started 1 to 4 weeks ago. The problem has been gradually worsening. Associated symptoms include urinary symptoms (dysuria, urinary odor). Pertinent negatives include no abdominal pain, change in bowel habit, coughing, fatigue, fever, nausea or vomiting. She has tried nothing for the symptoms.        The following portions of the patient's history were reviewed and updated as appropriate: allergies, current medications, past social history and problem list.    Review of Systems   Constitutional: Negative for activity change, appetite change, fatigue and fever.   Respiratory: Negative for cough and shortness of breath.    Gastrointestinal: Negative for abdominal pain, change in bowel habit, nausea and vomiting.   Genitourinary: Positive for dysuria. Negative for decreased urine volume, difficulty urinating, frequency, hematuria, urgency, vaginal bleeding and vaginal discharge.        Urinary odor \"strong\" denies fishy odor   Musculoskeletal: Negative for back pain.       Objective   Physical Exam   Constitutional: She is oriented to person, place, and time. She appears well-developed and well-nourished.   HENT:   Head: Normocephalic.   Nose: Nose normal.   Cardiovascular: Regular rhythm and normal heart sounds. Exam reveals no S3 and no S4.   No murmur heard.  Pulmonary/Chest: Effort normal and breath sounds normal. She has no decreased breath sounds. She has no wheezes. She has no rhonchi. She has no rales.   Abdominal: Normal appearance and bowel sounds are normal. There is no tenderness.   Musculoskeletal: She " exhibits no edema.   Neurological: She is alert and oriented to person, place, and time. Gait normal.   Skin: Skin is warm and dry.   Psychiatric: She has a normal mood and affect.       Assessment/Plan   Adela was seen today for difficulty urinating.    Diagnoses and all orders for this visit:    Dysuria  -     Urinalysis With Microscopic If Indicated (No Culture) - Urine, Clean Catch; Future  -     Urinalysis With Microscopic If Indicated (No Culture) - Urine, Clean Catch  -     Urinalysis, Microscopic Only - Urine, Clean Catch; Future  -     Urinalysis, Microscopic Only - Urine, Clean Catch  -     cephalexin (KEFLEX) 500 MG capsule; Take 1 capsule by mouth 2 (Two) Times a Day for 7 days.  -     Urine Culture - Urine, Urine, Clean Catch; Future  -     Urine Culture - Urine, Urine, Clean Catch        Urine with bacteria. Will treat based on her symptoms and adjust care if warranted. Be sure to drink plenty of water, void after intercourse.   She has physical scheduled for this upcoming Friday. Needs to consider STD panel. .

## 2019-06-10 NOTE — TELEPHONE ENCOUNTER
Call made to patient to inform her per Sharmaine she would need to come in to the acute clinic for an OV and urinalysis.    She stated she has an appointment on 6/14/19 and could wait than. I strongly advised her not to wait. She stated she has already held off for a week with out treatment. I again advised her to come in to the acute clinic for further evaluation.     She stated she would come in either this afternoon or tomorrow morning to the acute clinic.

## 2019-06-10 NOTE — PATIENT INSTRUCTIONS
Dysuria  Dysuria is pain or discomfort while urinating. The pain or discomfort may be felt in the tube that carries urine out of the bladder (urethra) or in the surrounding tissue of the genitals. The pain may also be felt in the groin area, lower abdomen, and lower back. You may have to urinate frequently or have the sudden feeling that you have to urinate (urgency). Dysuria can affect both men and women, but is more common in women.  Dysuria can be caused by many different things, including:  · Urinary tract infection in women.  · Infection of the kidney or bladder.  · Kidney stones or bladder stones.  · Certain sexually transmitted infections (STIs), such as chlamydia.  · Dehydration.  · Inflammation of the vagina.  · Use of certain medicines.  · Use of certain soaps or scented products that cause irritation.    Follow these instructions at home:  Watch your dysuria for any changes. The following actions may help to reduce any discomfort you are feeling:  · Drink enough fluid to keep your urine clear or pale yellow.  · Empty your bladder often. Avoid holding urine for long periods of time.  · After a bowel movement or urination, women should cleanse from front to back, using each tissue only once.  · Empty your bladder after sexual intercourse.  · Take medicines only as directed by your health care provider.  · If you were prescribed an antibiotic medicine, finish it all even if you start to feel better.  · Avoid caffeine, tea, and alcohol. They can irritate the bladder and make dysuria worse. In men, alcohol may irritate the prostate.  · Keep all follow-up visits as directed by your health care provider. This is important.  · If you had any tests done to find the cause of dysuria, it is your responsibility to obtain your test results. Ask the lab or department performing the test when and how you will get your results. Talk with your health care provider if you have any questions about your results.    Contact a  health care provider if:  · You develop pain in your back or sides.  · You have a fever.  · You have nausea or vomiting.  · You have blood in your urine.  · You are not urinating as often as you usually do.  Get help right away if:  · You pain is severe and not relieved with medicines.  · You are unable to hold down any fluids.  · You or someone else notices a change in your mental function.  · You have a rapid heartbeat at rest.  · You have shaking or chills.  · You feel extremely weak.  This information is not intended to replace advice given to you by your health care provider. Make sure you discuss any questions you have with your health care provider.  Document Released: 09/15/2005 Document Revised: 05/25/2017 Document Reviewed: 08/13/2015  [employer] Interactive Patient Education © 2018 [employer]

## 2019-06-12 LAB — BACTERIA SPEC AEROBE CULT: ABNORMAL

## 2019-06-14 ENCOUNTER — OFFICE VISIT (OUTPATIENT)
Dept: INTERNAL MEDICINE | Facility: CLINIC | Age: 54
End: 2019-06-14

## 2019-06-14 VITALS
HEIGHT: 64 IN | WEIGHT: 135 LBS | DIASTOLIC BLOOD PRESSURE: 82 MMHG | HEART RATE: 65 BPM | OXYGEN SATURATION: 98 % | BODY MASS INDEX: 23.05 KG/M2 | SYSTOLIC BLOOD PRESSURE: 120 MMHG

## 2019-06-14 DIAGNOSIS — R73.09 ELEVATED GLUCOSE: ICD-10-CM

## 2019-06-14 DIAGNOSIS — E78.49 OTHER HYPERLIPIDEMIA: ICD-10-CM

## 2019-06-14 DIAGNOSIS — M54.2 NECK PAIN: ICD-10-CM

## 2019-06-14 DIAGNOSIS — F39 MOOD DISORDER (HCC): ICD-10-CM

## 2019-06-14 DIAGNOSIS — E55.9 VITAMIN D DEFICIENCY: ICD-10-CM

## 2019-06-14 DIAGNOSIS — N39.0 FREQUENT UTI: ICD-10-CM

## 2019-06-14 DIAGNOSIS — Z12.11 COLON CANCER SCREENING: ICD-10-CM

## 2019-06-14 DIAGNOSIS — F51.01 PRIMARY INSOMNIA: ICD-10-CM

## 2019-06-14 DIAGNOSIS — R52 PAIN: ICD-10-CM

## 2019-06-14 DIAGNOSIS — F41.9 ANXIETY: ICD-10-CM

## 2019-06-14 DIAGNOSIS — Z12.4 CERVICAL CANCER SCREENING: ICD-10-CM

## 2019-06-14 DIAGNOSIS — Z00.00 ANNUAL PHYSICAL EXAM: Primary | ICD-10-CM

## 2019-06-14 DIAGNOSIS — G89.4 CHRONIC PAIN SYNDROME: ICD-10-CM

## 2019-06-14 DIAGNOSIS — I10 ESSENTIAL HYPERTENSION: ICD-10-CM

## 2019-06-14 DIAGNOSIS — R53.82 CHRONIC FATIGUE: ICD-10-CM

## 2019-06-14 LAB — HEMOCCULT STL QL IA: NEGATIVE

## 2019-06-14 PROCEDURE — 99396 PREV VISIT EST AGE 40-64: CPT | Performed by: INTERNAL MEDICINE

## 2019-06-14 PROCEDURE — 82274 ASSAY TEST FOR BLOOD FECAL: CPT | Performed by: INTERNAL MEDICINE

## 2019-06-14 RX ORDER — ESTRADIOL 0.1 MG/G
2 CREAM VAGINAL 2 TIMES WEEKLY
Qty: 1 EACH | Refills: 6 | Status: SHIPPED | OUTPATIENT
Start: 2019-06-17 | End: 2020-07-24 | Stop reason: SDUPTHER

## 2019-06-14 RX ORDER — ESCITALOPRAM OXALATE 20 MG/1
20 TABLET ORAL DAILY
Qty: 90 TABLET | Refills: 2 | Status: SHIPPED | OUTPATIENT
Start: 2019-06-14 | End: 2020-02-28

## 2019-06-14 RX ORDER — HYDROCODONE BITARTRATE AND ACETAMINOPHEN 10; 325 MG/1; MG/1
1 TABLET ORAL EVERY 6 HOURS PRN
Qty: 20 TABLET | Refills: 0 | Status: SHIPPED | OUTPATIENT
Start: 2019-06-14 | End: 2019-11-11 | Stop reason: SDUPTHER

## 2019-06-14 RX ORDER — LOSARTAN POTASSIUM AND HYDROCHLOROTHIAZIDE 12.5; 1 MG/1; MG/1
1 TABLET ORAL DAILY
Qty: 90 TABLET | Refills: 2 | Status: SHIPPED | OUTPATIENT
Start: 2019-06-14 | End: 2020-07-24 | Stop reason: SDUPTHER

## 2019-06-14 RX ORDER — SULFAMETHOXAZOLE AND TRIMETHOPRIM 800; 160 MG/1; MG/1
TABLET ORAL
Qty: 20 TABLET | Refills: 4 | Status: SHIPPED | OUTPATIENT
Start: 2019-06-14 | End: 2019-11-11

## 2019-06-14 RX ORDER — LORAZEPAM 1 MG/1
1 TABLET ORAL EVERY 8 HOURS PRN
Qty: 50 TABLET | Refills: 1 | Status: SHIPPED | OUTPATIENT
Start: 2019-06-14 | End: 2019-11-11 | Stop reason: SDUPTHER

## 2019-06-14 RX ORDER — HYDROCODONE BITARTRATE AND ACETAMINOPHEN 10; 325 MG/1; MG/1
1 TABLET ORAL EVERY 6 HOURS PRN
Qty: 60 TABLET | Refills: 0 | Status: SHIPPED | OUTPATIENT
Start: 2019-06-14 | End: 2019-06-14 | Stop reason: SDUPTHER

## 2019-06-14 RX ORDER — GABAPENTIN 800 MG/1
800 TABLET ORAL NIGHTLY
Qty: 90 TABLET | Refills: 1 | Status: SHIPPED | OUTPATIENT
Start: 2019-06-14 | End: 2020-01-08

## 2019-06-14 NOTE — PATIENT INSTRUCTIONS
Health Maintenance, Female  Adopting a healthy lifestyle and getting preventive care can go a long way to promote health and wellness. Talk with your health care provider about what schedule of regular examinations is right for you. This is a good chance for you to check in with your provider about disease prevention and staying healthy.  In between checkups, there are plenty of things you can do on your own. Experts have done a lot of research about which lifestyle changes and preventive measures are most likely to keep you healthy. Ask your health care provider for more information.  Weight and diet  Eat a healthy diet  · Be sure to include plenty of vegetables, fruits, low-fat dairy products, and lean protein.  · Do not eat a lot of foods high in solid fats, added sugars, or salt.  · Get regular exercise. This is one of the most important things you can do for your health.  ? Most adults should exercise for at least 150 minutes each week. The exercise should increase your heart rate and make you sweat (moderate-intensity exercise).  ? Most adults should also do strengthening exercises at least twice a week. This is in addition to the moderate-intensity exercise.    Maintain a healthy weight  · Body mass index (BMI) is a measurement that can be used to identify possible weight problems. It estimates body fat based on height and weight. Your health care provider can help determine your BMI and help you achieve or maintain a healthy weight.  · For females 20 years of age and older:  ? A BMI below 18.5 is considered underweight.  ? A BMI of 18.5 to 24.9 is normal.  ? A BMI of 25 to 29.9 is considered overweight.  ? A BMI of 30 and above is considered obese.    Watch levels of cholesterol and blood lipids  · You should start having your blood tested for lipids and cholesterol at 20 years of age, then have this test every 5 years.  · You may need to have your cholesterol levels checked more often if:  ? Your lipid or  cholesterol levels are high.  ? You are older than 50 years of age.  ? You are at high risk for heart disease.    Cancer screening  Lung Cancer  · Lung cancer screening is recommended for adults 55-80 years old who are at high risk for lung cancer because of a history of smoking.  · A yearly low-dose CT scan of the lungs is recommended for people who:  ? Currently smoke.  ? Have quit within the past 15 years.  ? Have at least a 30-pack-year history of smoking. A pack year is smoking an average of one pack of cigarettes a day for 1 year.  · Yearly screening should continue until it has been 15 years since you quit.  · Yearly screening should stop if you develop a health problem that would prevent you from having lung cancer treatment.    Breast Cancer  · Practice breast self-awareness. This means understanding how your breasts normally appear and feel.  · It also means doing regular breast self-exams. Let your health care provider know about any changes, no matter how small.  · If you are in your 20s or 30s, you should have a clinical breast exam (CBE) by a health care provider every 1-3 years as part of a regular health exam.  · If you are 40 or older, have a CBE every year. Also consider having a breast X-ray (mammogram) every year.  · If you have a family history of breast cancer, talk to your health care provider about genetic screening.  · If you are at high risk for breast cancer, talk to your health care provider about having an MRI and a mammogram every year.  · Breast cancer gene (BRCA) assessment is recommended for women who have family members with BRCA-related cancers. BRCA-related cancers include:  ? Breast.  ? Ovarian.  ? Tubal.  ? Peritoneal cancers.  · Results of the assessment will determine the need for genetic counseling and BRCA1 and BRCA2 testing.    Cervical Cancer  Your health care provider may recommend that you be screened regularly for cancer of the pelvic organs (ovaries, uterus, and  vagina). This screening involves a pelvic examination, including checking for microscopic changes to the surface of your cervix (Pap test). You may be encouraged to have this screening done every 3 years, beginning at age 21.  · For women ages 30-65, health care providers may recommend pelvic exams and Pap testing every 3 years, or they may recommend the Pap and pelvic exam, combined with testing for human papilloma virus (HPV), every 5 years. Some types of HPV increase your risk of cervical cancer. Testing for HPV may also be done on women of any age with unclear Pap test results.  · Other health care providers may not recommend any screening for nonpregnant women who are considered low risk for pelvic cancer and who do not have symptoms. Ask your health care provider if a screening pelvic exam is right for you.  · If you have had past treatment for cervical cancer or a condition that could lead to cancer, you need Pap tests and screening for cancer for at least 20 years after your treatment. If Pap tests have been discontinued, your risk factors (such as having a new sexual partner) need to be reassessed to determine if screening should resume. Some women have medical problems that increase the chance of getting cervical cancer. In these cases, your health care provider may recommend more frequent screening and Pap tests.    Colorectal Cancer  · This type of cancer can be detected and often prevented.  · Routine colorectal cancer screening usually begins at 50 years of age and continues through 75 years of age.  · Your health care provider may recommend screening at an earlier age if you have risk factors for colon cancer.  · Your health care provider may also recommend using home test kits to check for hidden blood in the stool.  · A small camera at the end of a tube can be used to examine your colon directly (sigmoidoscopy or colonoscopy). This is done to check for the earliest forms of colorectal  cancer.  · Routine screening usually begins at age 50.  · Direct examination of the colon should be repeated every 5-10 years through 75 years of age. However, you may need to be screened more often if early forms of precancerous polyps or small growths are found.    Skin Cancer  · Check your skin from head to toe regularly.  · Tell your health care provider about any new moles or changes in moles, especially if there is a change in a mole's shape or color.  · Also tell your health care provider if you have a mole that is larger than the size of a pencil eraser.  · Always use sunscreen. Apply sunscreen liberally and repeatedly throughout the day.  · Protect yourself by wearing long sleeves, pants, a wide-brimmed hat, and sunglasses whenever you are outside.    Heart disease, diabetes, and high blood pressure  · High blood pressure causes heart disease and increases the risk of stroke. High blood pressure is more likely to develop in:  ? People who have blood pressure in the high end of the normal range (130-139/85-89 mm Hg).  ? People who are overweight or obese.  ? People who are .  · If you are 18-39 years of age, have your blood pressure checked every 3-5 years. If you are 40 years of age or older, have your blood pressure checked every year. You should have your blood pressure measured twice--once when you are at a hospital or clinic, and once when you are not at a hospital or clinic. Record the average of the two measurements. To check your blood pressure when you are not at a hospital or clinic, you can use:  ? An automated blood pressure machine at a pharmacy.  ? A home blood pressure monitor.  · If you are between 55 years and 79 years old, ask your health care provider if you should take aspirin to prevent strokes.  · Have regular diabetes screenings. This involves taking a blood sample to check your fasting blood sugar level.  ? If you are at a normal weight and have a low risk for  diabetes, have this test once every three years after 45 years of age.  ? If you are overweight and have a high risk for diabetes, consider being tested at a younger age or more often.  Preventing infection  Hepatitis B  · If you have a higher risk for hepatitis B, you should be screened for this virus. You are considered at high risk for hepatitis B if:  ? You were born in a country where hepatitis B is common. Ask your health care provider which countries are considered high risk.  ? Your parents were born in a high-risk country, and you have not been immunized against hepatitis B (hepatitis B vaccine).  ? You have HIV or AIDS.  ? You use needles to inject street drugs.  ? You live with someone who has hepatitis B.  ? You have had sex with someone who has hepatitis B.  ? You get hemodialysis treatment.  ? You take certain medicines for conditions, including cancer, organ transplantation, and autoimmune conditions.    Hepatitis C  · Blood testing is recommended for:  ? Everyone born from 1945 through 1965.  ? Anyone with known risk factors for hepatitis C.    Sexually transmitted infections (STIs)  · You should be screened for sexually transmitted infections (STIs) including gonorrhea and chlamydia if:  ? You are sexually active and are younger than 24 years of age.  ? You are older than 24 years of age and your health care provider tells you that you are at risk for this type of infection.  ? Your sexual activity has changed since you were last screened and you are at an increased risk for chlamydia or gonorrhea. Ask your health care provider if you are at risk.  · If you do not have HIV, but are at risk, it may be recommended that you take a prescription medicine daily to prevent HIV infection. This is called pre-exposure prophylaxis (PrEP). You are considered at risk if:  ? You are sexually active and do not regularly use condoms or know the HIV status of your partner(s).  ? You take drugs by injection.  ? You  are sexually active with a partner who has HIV.    Talk with your health care provider about whether you are at high risk of being infected with HIV. If you choose to begin PrEP, you should first be tested for HIV. You should then be tested every 3 months for as long as you are taking PrEP.  Pregnancy  · If you are premenopausal and you may become pregnant, ask your health care provider about preconception counseling.  · If you may become pregnant, take 400 to 800 micrograms (mcg) of folic acid every day.  · If you want to prevent pregnancy, talk to your health care provider about birth control (contraception).  Osteoporosis and menopause  · Osteoporosis is a disease in which the bones lose minerals and strength with aging. This can result in serious bone fractures. Your risk for osteoporosis can be identified using a bone density scan.  · If you are 65 years of age or older, or if you are at risk for osteoporosis and fractures, ask your health care provider if you should be screened.  · Ask your health care provider whether you should take a calcium or vitamin D supplement to lower your risk for osteoporosis.  · Menopause may have certain physical symptoms and risks.  · Hormone replacement therapy may reduce some of these symptoms and risks.  Talk to your health care provider about whether hormone replacement therapy is right for you.  Follow these instructions at home:  · Schedule regular health, dental, and eye exams.  · Stay current with your immunizations.  · Do not use any tobacco products including cigarettes, chewing tobacco, or electronic cigarettes.  · If you are pregnant, do not drink alcohol.  · If you are breastfeeding, limit how much and how often you drink alcohol.  · Limit alcohol intake to no more than 1 drink per day for nonpregnant women. One drink equals 12 ounces of beer, 5 ounces of wine, or 1½ ounces of hard liquor.  · Do not use street drugs.  · Do not share needles.  · Ask your health care  provider for help if you need support or information about quitting drugs.  · Tell your health care provider if you often feel depressed.  · Tell your health care provider if you have ever been abused or do not feel safe at home.  This information is not intended to replace advice given to you by your health care provider. Make sure you discuss any questions you have with your health care provider.  Document Released: 07/02/2012 Document Revised: 05/25/2017 Document Reviewed: 09/20/2016  [employer] Interactive Patient Education © 2019 [employer]

## 2019-06-14 NOTE — PROGRESS NOTES
"Geena Morrison is a 53 y.o. female here for   Chief Complaint   Patient presents with   • Annual Exam   .    Vitals:    06/14/19 1351   BP: 120/82   BP Location: Right arm   Patient Position: Sitting   Cuff Size: Adult   Pulse: 65   SpO2: 98%   Weight: 61.2 kg (135 lb)   Height: 163.2 cm (64.25\")       Body mass index is 22.99 kg/m².    History of Present Illness     The following portions of the patient's history were reviewed and updated as appropriate: allergies, current medications, past social history and problem list.    Review of Systems   Constitutional: Positive for fatigue. Negative for appetite change, chills, fever and unexpected weight change.   HENT: Negative for congestion, ear pain, mouth sores, sinus pain, sore throat, tinnitus, trouble swallowing and voice change.    Eyes: Negative for pain and visual disturbance.   Respiratory: Negative for cough, choking, shortness of breath and wheezing.    Cardiovascular: Negative for chest pain, palpitations and leg swelling.   Gastrointestinal: Negative for abdominal pain, blood in stool, constipation, diarrhea, nausea and vomiting.   Endocrine: Negative for cold intolerance, heat intolerance, polydipsia and polyuria.   Genitourinary: Positive for difficulty urinating (frequent UTI). Negative for dysuria, flank pain, frequency, hematuria, urgency and vaginal bleeding (hyst).   Musculoskeletal: Positive for neck pain and neck stiffness. Negative for arthralgias, back pain, gait problem, joint swelling and myalgias.   Skin: Negative for color change and rash.   Allergic/Immunologic: Positive for environmental allergies (animal). Negative for food allergies and immunocompromised state.   Neurological: Negative for dizziness, tremors, seizures, syncope, speech difficulty, weakness, numbness and headaches.   Hematological: Negative for adenopathy. Does not bruise/bleed easily.   Psychiatric/Behavioral: Positive for sleep disturbance. Negative for " agitation, confusion, decreased concentration, dysphoric mood and suicidal ideas. The patient is nervous/anxious.        Objective   Physical Exam   Constitutional: She appears well-developed and well-nourished.   HENT:   Right Ear: Hearing, tympanic membrane, external ear and ear canal normal.   Left Ear: Hearing, tympanic membrane, external ear and ear canal normal.   Nose: Right sinus exhibits no maxillary sinus tenderness and no frontal sinus tenderness. Left sinus exhibits no maxillary sinus tenderness and no frontal sinus tenderness.   Eyes: Conjunctivae, EOM and lids are normal. Pupils are equal, round, and reactive to light.   Neck: Trachea normal. Neck supple. No JVD present. Carotid bruit is not present. No tracheal deviation present. No thyroid mass and no thyromegaly present.   Cardiovascular: Normal rate, regular rhythm, S1 normal and S2 normal. Exam reveals no gallop and no friction rub.   No murmur heard.  Pulses:       Carotid pulses are 2+ on the right side, and 2+ on the left side.       Radial pulses are 2+ on the right side, and 2+ on the left side.        Dorsalis pedis pulses are 2+ on the right side, and 2+ on the left side.        Posterior tibial pulses are 2+ on the right side, and 2+ on the left side.   Pulmonary/Chest: Effort normal and breath sounds normal. Chest wall is not dull to percussion. Right breast exhibits no inverted nipple, no mass, no nipple discharge, no skin change and no tenderness. Left breast exhibits no inverted nipple, no mass, no nipple discharge, no skin change and no tenderness.   Abdominal: Soft. Normal aorta and bowel sounds are normal. She exhibits no abdominal bruit. There is no hepatosplenomegaly. There is no tenderness. There is no rebound and no guarding. No hernia. Hernia confirmed negative in the right inguinal area and confirmed negative in the left inguinal area.   Genitourinary: Rectum normal and vagina normal. Rectal exam shows no mass and guaiac  negative stool. There is no rash, tenderness, lesion or injury on the right labia. There is no rash, tenderness, lesion or injury on the left labia. Right adnexum displays no mass, no tenderness and no fullness. Left adnexum displays no mass, no tenderness and no fullness.   Musculoskeletal: Normal range of motion. She exhibits no edema.   Lymphadenopathy:     She has no cervical adenopathy.     She has no axillary adenopathy. No inguinal adenopathy noted on the right or left side.        Right: No supraclavicular adenopathy present.        Left: No supraclavicular adenopathy present.   Neurological: She is alert. She has normal strength. No cranial nerve deficit or sensory deficit. She displays a negative Romberg sign.   Reflex Scores:       Patellar reflexes are 2+ on the right side and 2+ on the left side.  Skin: Skin is warm and dry.   Nursing note and vitals reviewed.    No cervix (s/p hysterectomy)    Assessment/Plan   Diagnoses and all orders for this visit:    Annual physical exam    Essential hypertension  Comments:  Controlled-call if blood pressures over 130/80.  Orders:  -     losartan-hydrochlorothiazide (HYZAAR) 100-12.5 MG per tablet; Take 1 tablet by mouth Daily.    Chronic fatigue  Comments:  She has increased stress with work and separation from her -seek counseling if not better.    Anxiety  Comments:  Worse with stress. She cannot use Ativan daily b/c it is a depressant & addictive.  She will use it very sparingly.    Elevated glucose  Comments:  Need low sugar diet.    Primary insomnia  -     gabapentin (NEURONTIN) 800 MG tablet; Take 1 tablet by mouth Every Night.    Neck pain  Comments:  Need repeat epidural injections since it was so helpful last time.  I am advising not to use hydrocodone except for extreme emergencies because it is addictive.  Orders:  -     Discontinue: HYDROcodone-acetaminophen (NORCO)  MG per tablet; Take 1 tablet by mouth Every 6 (Six) Hours As Needed for  Moderate Pain .  -     HYDROcodone-acetaminophen (NORCO)  MG per tablet; Take 1 tablet by mouth Every 6 (Six) Hours As Needed for Moderate Pain .  -     Ambulatory Referral to Hospital Pain Management Department    Vitamin D deficiency    Other hyperlipidemia  Comments:  Diet and exercise.    Chronic pain syndrome  -     Ambulatory Referral to Hospital Pain Management Department    Pain  Comments:  need to use hydrocodone only for signif pain  Orders:  -     gabapentin (NEURONTIN) 800 MG tablet; Take 1 tablet by mouth Every Night.  -     Discontinue: HYDROcodone-acetaminophen (NORCO)  MG per tablet; Take 1 tablet by mouth Every 6 (Six) Hours As Needed for Moderate Pain .  -     HYDROcodone-acetaminophen (NORCO)  MG per tablet; Take 1 tablet by mouth Every 6 (Six) Hours As Needed for Moderate Pain .    Primary insomnia  Comments:  ok with nightly gabapentin (she states no side effects)  Orders:  -     gabapentin (NEURONTIN) 800 MG tablet; Take 1 tablet by mouth Every Night.    Mood disorder (CMS/[facility])  Comments:  stable - continue ativan sparingly (not daily)  Orders:  -     escitalopram (LEXAPRO) 20 MG tablet; Take 1 tablet by mouth Daily.  -     LORazepam (ATIVAN) 1 MG tablet; Take 1 tablet by mouth Every 8 (Eight) Hours As Needed for Anxiety.    Neck pain  Comments:  call if worse  Orders:  -     Discontinue: HYDROcodone-acetaminophen (NORCO)  MG per tablet; Take 1 tablet by mouth Every 6 (Six) Hours As Needed for Moderate Pain .  -     HYDROcodone-acetaminophen (NORCO)  MG per tablet; Take 1 tablet by mouth Every 6 (Six) Hours As Needed for Moderate Pain .  -     Ambulatory Referral to Hospital Pain Management Department    Frequent UTI  Comments:  Discussed need for topical estrogen to prevent bladder infections.  She may use an antibiotic after each intercourse to prevent infections.  Orders:  -     estrogens, conjugated, (PREMARIN) 0.3 MG tablet; Take 1 tablet by mouth Daily. Take  daily for 21 days then do not take for 7 days.  -     ESTRACE VAGINAL 0.1 MG/GM vaginal cream; Insert 2 g into the vagina 2 (Two) Times a Week.    Colon cancer screening  -     POC FECAL OCCULT BLOOD BY IMMUNOASSAY    Cervical cancer screening  -     Pap IG, HPV-hr; Future    Other orders  -     sulfamethoxazole-trimethoprim (BACTRIM DS) 800-160 MG per tablet; 1 after intercourse       Try to wean off estrogen.

## 2019-06-18 LAB
CHROM ANALY OVERALL INTERP-IMP: NORMAL
CONV .: NORMAL
CONV PERFORMED BY:: NORMAL
DX ICD CODE: NORMAL
HIV 1 & 2 AB SER-IMP: NORMAL
HPV I/H RISK 1 DNA CVX QL PROBE+SIG AMP: NEGATIVE
REF LAB TEST METHOD: NORMAL
STAT OF ADQ CVX/VAG CYTO-IMP: NORMAL

## 2019-07-03 ENCOUNTER — APPOINTMENT (OUTPATIENT)
Dept: PAIN MEDICINE | Facility: HOSPITAL | Age: 54
End: 2019-07-03

## 2019-08-27 DIAGNOSIS — N39.0 FREQUENT UTI: ICD-10-CM

## 2019-08-28 ENCOUNTER — TELEPHONE (OUTPATIENT)
Dept: INTERNAL MEDICINE | Facility: CLINIC | Age: 54
End: 2019-08-28

## 2019-08-28 ENCOUNTER — OFFICE VISIT (OUTPATIENT)
Dept: INTERNAL MEDICINE | Facility: CLINIC | Age: 54
End: 2019-08-28

## 2019-08-28 VITALS
SYSTOLIC BLOOD PRESSURE: 110 MMHG | BODY MASS INDEX: 23.93 KG/M2 | HEIGHT: 64 IN | WEIGHT: 140.2 LBS | HEART RATE: 73 BPM | DIASTOLIC BLOOD PRESSURE: 70 MMHG | OXYGEN SATURATION: 100 % | TEMPERATURE: 97.3 F

## 2019-08-28 DIAGNOSIS — R06.2 WHEEZING: ICD-10-CM

## 2019-08-28 DIAGNOSIS — N39.0 RECURRENT UTI: Primary | ICD-10-CM

## 2019-08-28 DIAGNOSIS — R30.0 BURNING WITH URINATION: ICD-10-CM

## 2019-08-28 DIAGNOSIS — R94.2 OBSTRUCTIVE PATTERN PRESENT ON PULMONARY FUNCTION TESTING: ICD-10-CM

## 2019-08-28 DIAGNOSIS — N39.0 FREQUENT UTI: ICD-10-CM

## 2019-08-28 LAB
BACTERIA UR QL AUTO: ABNORMAL /HPF
BILIRUB UR QL STRIP: NEGATIVE
CLARITY UR: ABNORMAL
COLOR UR: YELLOW
GLUCOSE UR STRIP-MCNC: NEGATIVE MG/DL
HGB UR QL STRIP.AUTO: NEGATIVE
HYALINE CASTS UR QL AUTO: ABNORMAL /LPF
KETONES UR QL STRIP: NEGATIVE
LEUKOCYTE ESTERASE UR QL STRIP.AUTO: ABNORMAL
MUCOUS THREADS URNS QL MICRO: ABNORMAL /HPF
NITRITE UR QL STRIP: POSITIVE
PH UR STRIP.AUTO: 7.5 [PH] (ref 5–8)
PROT UR QL STRIP: NEGATIVE
RBC # UR: ABNORMAL /HPF
REF LAB TEST METHOD: ABNORMAL
SP GR UR STRIP: 1.01 (ref 1–1.03)
SQUAMOUS #/AREA URNS HPF: ABNORMAL /HPF
UROBILINOGEN UR QL STRIP: ABNORMAL
WBC UR QL AUTO: ABNORMAL /HPF

## 2019-08-28 PROCEDURE — 87077 CULTURE AEROBIC IDENTIFY: CPT | Performed by: NURSE PRACTITIONER

## 2019-08-28 PROCEDURE — 99213 OFFICE O/P EST LOW 20 MIN: CPT | Performed by: NURSE PRACTITIONER

## 2019-08-28 PROCEDURE — 81001 URINALYSIS AUTO W/SCOPE: CPT | Performed by: NURSE PRACTITIONER

## 2019-08-28 PROCEDURE — 87086 URINE CULTURE/COLONY COUNT: CPT | Performed by: NURSE PRACTITIONER

## 2019-08-28 PROCEDURE — 87186 SC STD MICRODIL/AGAR DIL: CPT | Performed by: NURSE PRACTITIONER

## 2019-08-28 RX ORDER — TRIAMCINOLONE ACETONIDE 1 MG/G
CREAM TOPICAL
COMMUNITY
Start: 2019-07-01 | End: 2019-08-28

## 2019-08-28 NOTE — PROGRESS NOTES
Subjective   Adela Morrison is a 53 y.o. female.     Foul odor to urine x 1 month. She is taking Bactrim as needed due to frequent UTIs after intercourse. She was last seen here in the office in June which is when she was given the abx with refills. She took one Bactrim last night.       Urinary Tract Infection    There has been no fever. Pertinent negatives include no discharge, flank pain, frequency, hematuria, hesitancy or urgency. She has tried antibiotics for the symptoms.        The following portions of the patient's history were reviewed and updated as appropriate: allergies, current medications, past family history, past medical history, past social history, past surgical history and problem list.    Review of Systems   Constitutional: Negative for fatigue and fever.   Respiratory: Negative for cough, chest tightness, shortness of breath and wheezing.    Cardiovascular: Negative for chest pain.   Gastrointestinal: Negative for abdominal pain.   Genitourinary: Negative for decreased urine volume, difficulty urinating, dyspareunia, dysuria, flank pain, frequency, hematuria, hesitancy, urgency and vaginal pain.       Objective   Physical Exam   Constitutional: She appears well-developed and well-nourished.   HENT:   Head: Normocephalic and atraumatic.   Cardiovascular: Normal rate, regular rhythm and normal heart sounds.   No murmur heard.  Pulmonary/Chest: Effort normal. No respiratory distress. She has wheezes (expiratory).   Musculoskeletal: Normal range of motion.   Neurological: She is alert.   Skin: Skin is warm and dry.   Psychiatric: She has a normal mood and affect. Her behavior is normal. Judgment and thought content normal.   Vitals reviewed.      Assessment/Plan   Adela was seen today for urinary tract infection.    Diagnoses and all orders for this visit:    Recurrent UTI  -     Ambulatory Referral to Urology    Burning with urination  -     Urinalysis With Microscopic If Indicated (No Culture) -  Urine, Clean Catch; Future  -     Urinalysis With Microscopic If Indicated (No Culture) - Urine, Clean Catch  -     Urinalysis, Microscopic Only - Urine, Clean Catch; Future  -     Urinalysis, Microscopic Only - Urine, Clean Catch  -     Urine Culture - Urine, Urine, Clean Catch; Future  -     Urine Culture - Urine, Urine, Clean Catch    Wheezing  -     Pulmonary Function Test    Obstructive pattern present on pulmonary function testing  -     mometasone-formoterol (DULERA 100) 100-5 MCG/ACT inhaler; Inhale 2 puffs 2 (Two) Times a Day for 14 days. Rinse and spit after use    Pulmonary Function Test  Performed by: Patsy Aaron APRN  Authorized by: Patsy Aaron APRN       PFT shows early obstructive pulmonary impairment.    She will take her already prescribed Bactrim twice daily for 1 wk instead of as needed unless culture leads to a different abx.    Increase fluid intake.    Make sure to take deep breaths and cough to expand lungs.    She will f/u in 2 wks for evaluation with PFT.

## 2019-08-28 NOTE — TELEPHONE ENCOUNTER
Regarding: Non-Urgent Medical Question  Contact: [phone]  ----- Message from [employer], Generic sent at 8/28/2019 11:54 AM EDT -----    Dr. Huston, you sent a prescription through to [facility] for me yesterday for Premarin Tabs 0.625mg but [employer] now uses [facility].  Can you resend the request to [facility]?    Thanks.  Adela

## 2019-08-30 DIAGNOSIS — N39.0 URINARY TRACT INFECTION WITHOUT HEMATURIA, SITE UNSPECIFIED: ICD-10-CM

## 2019-08-30 DIAGNOSIS — N39.0 RECURRENT UTI: Primary | ICD-10-CM

## 2019-08-30 LAB — BACTERIA SPEC AEROBE CULT: ABNORMAL

## 2019-08-30 RX ORDER — NITROFURANTOIN 25; 75 MG/1; MG/1
100 CAPSULE ORAL EVERY 12 HOURS SCHEDULED
Qty: 14 CAPSULE | Refills: 0 | Status: SHIPPED | OUTPATIENT
Start: 2019-08-30 | End: 2019-09-06

## 2019-11-11 ENCOUNTER — OFFICE VISIT (OUTPATIENT)
Dept: INTERNAL MEDICINE | Facility: CLINIC | Age: 54
End: 2019-11-11

## 2019-11-11 VITALS
DIASTOLIC BLOOD PRESSURE: 84 MMHG | WEIGHT: 149 LBS | SYSTOLIC BLOOD PRESSURE: 136 MMHG | BODY MASS INDEX: 25.44 KG/M2 | HEIGHT: 64 IN

## 2019-11-11 DIAGNOSIS — E55.9 VITAMIN D DEFICIENCY: ICD-10-CM

## 2019-11-11 DIAGNOSIS — I10 ESSENTIAL HYPERTENSION: Primary | ICD-10-CM

## 2019-11-11 DIAGNOSIS — E78.49 OTHER HYPERLIPIDEMIA: ICD-10-CM

## 2019-11-11 DIAGNOSIS — F41.9 ANXIETY: ICD-10-CM

## 2019-11-11 DIAGNOSIS — R52 PAIN: ICD-10-CM

## 2019-11-11 DIAGNOSIS — G89.4 CHRONIC PAIN SYNDROME: ICD-10-CM

## 2019-11-11 DIAGNOSIS — R73.09 ELEVATED GLUCOSE: ICD-10-CM

## 2019-11-11 DIAGNOSIS — R53.82 CHRONIC FATIGUE: ICD-10-CM

## 2019-11-11 DIAGNOSIS — M54.2 NECK PAIN: ICD-10-CM

## 2019-11-11 PROCEDURE — 99214 OFFICE O/P EST MOD 30 MIN: CPT | Performed by: INTERNAL MEDICINE

## 2019-11-11 RX ORDER — LORAZEPAM 1 MG/1
1 TABLET ORAL EVERY 8 HOURS PRN
Qty: 50 TABLET | Refills: 1 | Status: SHIPPED | OUTPATIENT
Start: 2019-11-11 | End: 2020-02-28 | Stop reason: SDUPTHER

## 2019-11-11 RX ORDER — HYDROCODONE BITARTRATE AND ACETAMINOPHEN 10; 325 MG/1; MG/1
1 TABLET ORAL EVERY 6 HOURS PRN
Qty: 30 TABLET | Refills: 0 | Status: SHIPPED | OUTPATIENT
Start: 2019-11-11 | End: 2020-02-28 | Stop reason: SDUPTHER

## 2019-11-11 NOTE — PROGRESS NOTES
"Geena Morrison is a 53 y.o. female here for   Chief Complaint   Patient presents with   • Anxiety     Lorazepam   • Insomnia     Gabapentin   • Pain     Hydrocodone   .    Vitals:    11/11/19 1527 11/11/19 1654   BP: 162/90 136/84   BP Location: Left arm    Patient Position: Sitting    Cuff Size: Adult    Weight: 67.6 kg (149 lb)    Height: 163.2 cm (64.25\")        Body mass index is 25.38 kg/m².    Anxiety   Presents for follow-up visit. Symptoms include insomnia and nervous/anxious behavior. Patient reports no chest pain, palpitations or shortness of breath.       Insomnia   This is a recurrent problem. The current episode started more than 1 year ago. The problem occurs intermittently. The problem has been waxing and waning. Associated symptoms include fatigue. Pertinent negatives include no chest pain, chills, coughing or fever.   Pain   This is a recurrent problem. The current episode started more than 1 year ago. The problem occurs intermittently. The problem has been unchanged. Associated symptoms include fatigue. Pertinent negatives include no chest pain, chills, coughing or fever.   Fatigue   This is a chronic problem. The current episode started more than 1 year ago. The problem occurs constantly. The problem has been unchanged. Associated symptoms include fatigue. Pertinent negatives include no chest pain, chills, coughing or fever.        The following portions of the patient's history were reviewed and updated as appropriate: allergies, current medications, past social history and problem list.    Review of Systems   Constitutional: Positive for fatigue. Negative for chills and fever.   Respiratory: Negative for cough, shortness of breath and wheezing.    Cardiovascular: Negative for chest pain, palpitations and leg swelling.   Psychiatric/Behavioral: Positive for dysphoric mood and sleep disturbance. The patient is nervous/anxious and has insomnia.      Her fatigue was better after vit B12 " shot (& vacation) in [place].    Objective   Physical Exam   Constitutional: She appears well-developed and well-nourished. No distress.   Cardiovascular: Normal rate, regular rhythm and normal heart sounds.   Pulmonary/Chest: No respiratory distress. She has no wheezes. She has no rales. She exhibits no tenderness.   Musculoskeletal: She exhibits no edema.   Psychiatric: She has a normal mood and affect. Her behavior is normal.   Nursing note and vitals reviewed.      Assessment/Plan   Diagnoses and all orders for this visit:    Essential hypertension  Comments:  Controlled-need weekly check and call if blood pressures over 130/80.  Orders:  -     CBC Auto Differential; Future  -     Comprehensive Metabolic Panel; Future  -     Lipid Panel; Future  -     Urinalysis With Microscopic If Indicated (No Culture) - Urine, Clean Catch; Future    Chronic fatigue  Comments:  Discussed that pain and anxiety medicine can cause fatigue -need to use as little as possible, exercise every day and call if problem persists -return for labs  Orders:  -     TSH Rfx On Abnormal To Free T4; Future  -     Vitamin B12 & Folate; Future    Elevated glucose  Comments:  Need low sugar diet.    Vitamin D deficiency  Comments:  Continue daily vitamin D.    Other hyperlipidemia  Comments:  continue diet/ex    Anxiety  Comments:  2x weekly - no alcohol or driving  Orders:  -     LORazepam (ATIVAN) 1 MG tablet; Take 1 tablet by mouth Every 8 (Eight) Hours As Needed for Anxiety.    Chronic pain syndrome  Comments:  neck pain - better with epidural injection x3 in 2018 - she will repeat this after holidays - ok with occ hydrocodone    Pain  Comments:  Neck pain-need to use hydrocodone only for signif pain-need to repeat epidural injections which helped in the past.  Orders:  -     HYDROcodone-acetaminophen (NORCO)  MG per tablet; Take 1 tablet by mouth Every 6 (Six) Hours As Needed for Moderate Pain .    Neck pain  Comments:  Need repeat  epidural injections since it was so helpful last time.  I am advising not to use hydrocodone except for extreme emergencies because it is addictive.  Orders:  -     HYDROcodone-acetaminophen (NORCO)  MG per tablet; Take 1 tablet by mouth Every 6 (Six) Hours As Needed for Moderate Pain .    Anxiety  Comments:  stable - continue ativan sparingly (not daily)  Orders:  -     LORazepam (ATIVAN) 1 MG tablet; Take 1 tablet by mouth Every 8 (Eight) Hours As Needed for Anxiety.

## 2019-12-10 ENCOUNTER — LAB (OUTPATIENT)
Dept: INTERNAL MEDICINE | Facility: CLINIC | Age: 54
End: 2019-12-10

## 2019-12-10 DIAGNOSIS — R53.82 CHRONIC FATIGUE: ICD-10-CM

## 2019-12-10 DIAGNOSIS — I10 ESSENTIAL HYPERTENSION: Primary | ICD-10-CM

## 2019-12-10 DIAGNOSIS — R82.90 ABNORMAL URINE FINDINGS: ICD-10-CM

## 2019-12-10 LAB
ALBUMIN SERPL-MCNC: 4.2 G/DL (ref 3.5–5.2)
ALBUMIN/GLOB SERPL: 1.3 G/DL
ALP SERPL-CCNC: 63 U/L (ref 39–117)
ALT SERPL W P-5'-P-CCNC: 27 U/L (ref 1–33)
ANION GAP SERPL CALCULATED.3IONS-SCNC: 10.3 MMOL/L (ref 5–15)
AST SERPL-CCNC: 24 U/L (ref 1–32)
BACTERIA UR QL AUTO: ABNORMAL /HPF
BASOPHILS # BLD AUTO: 0.03 10*3/MM3 (ref 0–0.2)
BASOPHILS NFR BLD AUTO: 0.3 % (ref 0–1.5)
BILIRUB SERPL-MCNC: 0.8 MG/DL (ref 0.2–1.2)
BILIRUB UR QL STRIP: NEGATIVE
BUN BLD-MCNC: 12 MG/DL (ref 6–20)
BUN/CREAT SERPL: 16.9 (ref 7–25)
CALCIUM SPEC-SCNC: 8.8 MG/DL (ref 8.6–10.5)
CHLORIDE SERPL-SCNC: 99 MMOL/L (ref 98–107)
CHOLEST SERPL-MCNC: 222 MG/DL (ref 0–200)
CLARITY UR: ABNORMAL
CO2 SERPL-SCNC: 28.7 MMOL/L (ref 22–29)
COLOR UR: YELLOW
CREAT BLD-MCNC: 0.71 MG/DL (ref 0.57–1)
DEPRECATED RDW RBC AUTO: 44.3 FL (ref 37–54)
EOSINOPHIL # BLD AUTO: 0.3 10*3/MM3 (ref 0–0.4)
EOSINOPHIL NFR BLD AUTO: 3.4 % (ref 0.3–6.2)
ERYTHROCYTE [DISTWIDTH] IN BLOOD BY AUTOMATED COUNT: 12.4 % (ref 12.3–15.4)
FOLATE SERPL-MCNC: 9.98 NG/ML (ref 4.78–24.2)
GFR SERPL CREATININE-BSD FRML MDRD: 85 ML/MIN/1.73
GLOBULIN UR ELPH-MCNC: 3.2 GM/DL
GLUCOSE BLD-MCNC: 101 MG/DL (ref 65–99)
GLUCOSE UR STRIP-MCNC: NEGATIVE MG/DL
HCT VFR BLD AUTO: 39.7 % (ref 34–46.6)
HDLC SERPL-MCNC: 72 MG/DL (ref 40–60)
HGB BLD-MCNC: 13 G/DL (ref 12–15.9)
HGB UR QL STRIP.AUTO: NEGATIVE
HYALINE CASTS UR QL AUTO: ABNORMAL /LPF
KETONES UR QL STRIP: NEGATIVE
LDLC SERPL CALC-MCNC: 130 MG/DL (ref 0–100)
LDLC/HDLC SERPL: 1.81 {RATIO}
LEUKOCYTE ESTERASE UR QL STRIP.AUTO: NEGATIVE
LYMPHOCYTES # BLD AUTO: 1.47 10*3/MM3 (ref 0.7–3.1)
LYMPHOCYTES NFR BLD AUTO: 16.5 % (ref 19.6–45.3)
MCH RBC QN AUTO: 31.7 PG (ref 26.6–33)
MCHC RBC AUTO-ENTMCNC: 32.7 G/DL (ref 31.5–35.7)
MCV RBC AUTO: 96.8 FL (ref 79–97)
MONOCYTES # BLD AUTO: 0.66 10*3/MM3 (ref 0.1–0.9)
MONOCYTES NFR BLD AUTO: 7.4 % (ref 5–12)
MUCOUS THREADS URNS QL MICRO: ABNORMAL /HPF
NEUTROPHILS # BLD AUTO: 6.44 10*3/MM3 (ref 1.7–7)
NEUTROPHILS NFR BLD AUTO: 72.4 % (ref 42.7–76)
NITRITE UR QL STRIP: POSITIVE
PH UR STRIP.AUTO: 7.5 [PH] (ref 5–8)
PLATELET # BLD AUTO: 261 10*3/MM3 (ref 140–450)
PMV BLD AUTO: 10.1 FL (ref 6–12)
POTASSIUM BLD-SCNC: 4.2 MMOL/L (ref 3.5–5.2)
PROT SERPL-MCNC: 7.4 G/DL (ref 6–8.5)
PROT UR QL STRIP: NEGATIVE
RBC # BLD AUTO: 4.1 10*6/MM3 (ref 3.77–5.28)
RBC # UR: ABNORMAL /HPF
REF LAB TEST METHOD: ABNORMAL
SODIUM BLD-SCNC: 138 MMOL/L (ref 136–145)
SP GR UR STRIP: 1.01 (ref 1–1.03)
SQUAMOUS #/AREA URNS HPF: ABNORMAL /HPF
TRIGL SERPL-MCNC: 98 MG/DL (ref 0–150)
TSH SERPL DL<=0.05 MIU/L-ACNC: 1.65 UIU/ML (ref 0.27–4.2)
UROBILINOGEN UR QL STRIP: ABNORMAL
VIT B12 BLD-MCNC: 743 PG/ML (ref 211–946)
VLDLC SERPL-MCNC: 19.6 MG/DL (ref 5–40)
WBC NRBC COR # BLD: 8.9 10*3/MM3 (ref 3.4–10.8)
WBC UR QL AUTO: ABNORMAL /HPF

## 2019-12-10 PROCEDURE — 36415 COLL VENOUS BLD VENIPUNCTURE: CPT | Performed by: INTERNAL MEDICINE

## 2019-12-10 PROCEDURE — 80053 COMPREHEN METABOLIC PANEL: CPT | Performed by: INTERNAL MEDICINE

## 2019-12-10 PROCEDURE — 84443 ASSAY THYROID STIM HORMONE: CPT | Performed by: INTERNAL MEDICINE

## 2019-12-10 PROCEDURE — 85025 COMPLETE CBC W/AUTO DIFF WBC: CPT | Performed by: INTERNAL MEDICINE

## 2019-12-10 PROCEDURE — 80061 LIPID PANEL: CPT | Performed by: INTERNAL MEDICINE

## 2019-12-10 PROCEDURE — 82607 VITAMIN B-12: CPT | Performed by: INTERNAL MEDICINE

## 2019-12-10 PROCEDURE — 81001 URINALYSIS AUTO W/SCOPE: CPT | Performed by: INTERNAL MEDICINE

## 2019-12-10 PROCEDURE — 82746 ASSAY OF FOLIC ACID SERUM: CPT | Performed by: INTERNAL MEDICINE

## 2019-12-12 RX ORDER — AZITHROMYCIN 250 MG/1
TABLET, FILM COATED ORAL
Qty: 6 TABLET | Refills: 0 | Status: SHIPPED | OUTPATIENT
Start: 2019-12-12 | End: 2020-02-28 | Stop reason: SDUPTHER

## 2019-12-14 LAB
BACTERIA UR CULT: ABNORMAL
Lab: ABNORMAL
SUSCEPTIBILITY TESTING: ABNORMAL

## 2020-01-06 DIAGNOSIS — R52 PAIN: ICD-10-CM

## 2020-01-06 DIAGNOSIS — F51.01 PRIMARY INSOMNIA: ICD-10-CM

## 2020-01-08 DIAGNOSIS — N39.0 FREQUENT UTI: ICD-10-CM

## 2020-01-08 RX ORDER — GABAPENTIN 800 MG/1
TABLET ORAL
Qty: 90 TABLET | Refills: 0 | OUTPATIENT
Start: 2020-01-08 | End: 2020-04-28

## 2020-01-08 NOTE — TELEPHONE ENCOUNTER
----- Message from Abeba Rushing sent at 1/8/2020 10:44 AM EST -----  Contact: Daly with [facility]  Refill needed for patient     estrogens, conjugated, (PREMARIN) 0.625 MG tablet 90 tablet    Sig - Route: Take 1 tablet by mouth Daily. Take daily for 21 days then do not take for 7 days. - Oral     [facility] - [place], [place] - [facility] - [phone]  - [phone] FX

## 2020-01-08 NOTE — TELEPHONE ENCOUNTER
Please review refill request:    Last OV: 11/11/2019    Last Prescribed: 6/14/19    ZEINA: Ordered    Thank you,    Von

## 2020-02-28 ENCOUNTER — OFFICE VISIT (OUTPATIENT)
Dept: INTERNAL MEDICINE | Facility: CLINIC | Age: 55
End: 2020-02-28

## 2020-02-28 VITALS
BODY MASS INDEX: 24.75 KG/M2 | DIASTOLIC BLOOD PRESSURE: 88 MMHG | HEIGHT: 64 IN | SYSTOLIC BLOOD PRESSURE: 128 MMHG | WEIGHT: 145 LBS

## 2020-02-28 DIAGNOSIS — N39.0 FREQUENT UTI: ICD-10-CM

## 2020-02-28 DIAGNOSIS — E78.49 OTHER HYPERLIPIDEMIA: ICD-10-CM

## 2020-02-28 DIAGNOSIS — R73.09 ELEVATED GLUCOSE: ICD-10-CM

## 2020-02-28 DIAGNOSIS — F41.9 ANXIETY: ICD-10-CM

## 2020-02-28 DIAGNOSIS — R52 PAIN: ICD-10-CM

## 2020-02-28 DIAGNOSIS — G89.4 CHRONIC PAIN SYNDROME: ICD-10-CM

## 2020-02-28 DIAGNOSIS — I10 ESSENTIAL HYPERTENSION: Primary | ICD-10-CM

## 2020-02-28 DIAGNOSIS — Z12.31 ENCOUNTER FOR SCREENING MAMMOGRAM FOR BREAST CANCER: ICD-10-CM

## 2020-02-28 DIAGNOSIS — M54.2 NECK PAIN: ICD-10-CM

## 2020-02-28 DIAGNOSIS — F51.01 PRIMARY INSOMNIA: ICD-10-CM

## 2020-02-28 PROCEDURE — 99214 OFFICE O/P EST MOD 30 MIN: CPT | Performed by: INTERNAL MEDICINE

## 2020-02-28 RX ORDER — HYDROCODONE BITARTRATE AND ACETAMINOPHEN 10; 325 MG/1; MG/1
1 TABLET ORAL EVERY 6 HOURS PRN
Qty: 30 TABLET | Refills: 0 | Status: SHIPPED | OUTPATIENT
Start: 2020-02-28 | End: 2020-07-24 | Stop reason: SDUPTHER

## 2020-02-28 RX ORDER — SULFAMETHOXAZOLE AND TRIMETHOPRIM 800; 160 MG/1; MG/1
1 TABLET ORAL
COMMUNITY
Start: 2020-02-18 | End: 2020-07-24

## 2020-02-28 RX ORDER — LORAZEPAM 1 MG/1
1 TABLET ORAL EVERY 8 HOURS PRN
Qty: 50 TABLET | Refills: 1 | Status: SHIPPED | OUTPATIENT
Start: 2020-02-28 | End: 2020-07-24 | Stop reason: SDUPTHER

## 2020-02-28 NOTE — PROGRESS NOTES
"Geena Morrison is a 54 y.o. female here for   Chief Complaint   Patient presents with   • Anxiety   • Hyperlipidemia   • Hypertension   • Urinary Frequency     frequent UTI's   .    Vitals:    02/28/20 0936   BP: 128/88   BP Location: Left arm   Patient Position: Sitting   Cuff Size: Adult   Weight: 65.8 kg (145 lb)   Height: 163.2 cm (64.25\")       Body mass index is 24.7 kg/m².    Anxiety   Presents for follow-up visit. Symptoms include excessive worry and nervous/anxious behavior. Patient reports no chest pain, palpitations or shortness of breath. Symptoms occur most days. The severity of symptoms is moderate.       Hyperlipidemia   This is a chronic problem. The current episode started more than 1 year ago. The problem is controlled. Recent lipid tests were reviewed and are normal. Pertinent negatives include no chest pain or shortness of breath.   Hypertension   This is a chronic problem. The current episode started more than 1 year ago. The problem is unchanged. The problem is controlled. Associated symptoms include anxiety and neck pain. Pertinent negatives include no chest pain, palpitations or shortness of breath.   Urinary Frequency    Associated symptoms include frequency (frequent UTI's). Pertinent negatives include no chills.        The following portions of the patient's history were reviewed and updated as appropriate: allergies, current medications, past social history and problem list.    Review of Systems   Constitutional: Negative for chills, fatigue and fever.   Respiratory: Negative for cough, shortness of breath and wheezing.    Cardiovascular: Negative for chest pain, palpitations and leg swelling.   Genitourinary: Positive for frequency (frequent UTI's).   Musculoskeletal: Positive for neck pain and neck stiffness.   Psychiatric/Behavioral: Positive for sleep disturbance. Negative for dysphoric mood. The patient is nervous/anxious.        Objective   Physical Exam "   Constitutional: She appears well-developed and well-nourished. No distress.   Cardiovascular: Normal rate, regular rhythm and normal heart sounds.   Pulmonary/Chest: No respiratory distress. She has no wheezes. She has no rales. She exhibits no tenderness.   Musculoskeletal: She exhibits no edema.   Psychiatric: She has a normal mood and affect. Her behavior is normal.   Nursing note and vitals reviewed.      Assessment/Plan   Diagnoses and all orders for this visit:    Essential hypertension  Comments:  controlled - call if bp over 130/80    Encounter for screening mammogram for breast cancer  -     Mammo Screening Bilateral With CAD; Future    Anxiety  Comments:  call if worse since stopped lexapro sev days ago  Orders:  -     LORazepam (ATIVAN) 1 MG tablet; Take 1 tablet by mouth Every 8 (Eight) Hours As Needed for Anxiety.    Primary insomnia    Elevated glucose  Comments:  need low sugar diet    Other hyperlipidemia  Comments:  need diet/ex    Chronic pain syndrome    Pain  Comments:  Neck pain-need to use hydrocodone only for signif pain-need to repeat epidural injections which helped in the past.  Orders:  -     HYDROcodone-acetaminophen (NORCO)  MG per tablet; Take 1 tablet by mouth Every 6 (Six) Hours As Needed for Moderate Pain .    Neck pain  Comments:  Need repeat epidural injections since it was so helpful last time.  I am advising not to use hydrocodone except for extreme emergencies because it is addictive.  Orders:  -     HYDROcodone-acetaminophen (NORCO)  MG per tablet; Take 1 tablet by mouth Every 6 (Six) Hours As Needed for Moderate Pain .    Anxiety  Comments:  stable - continue ativan sparingly (not daily)  Orders:  -     LORazepam (ATIVAN) 1 MG tablet; Take 1 tablet by mouth Every 8 (Eight) Hours As Needed for Anxiety.    Frequent UTI  Comments:  she states she need daily antibiotic (dangerous) - refer to uro-gyn  Orders:  -     Ambulatory Referral to Gynecology    Other orders  -      sulfamethoxazole-trimethoprim (BACTRIM DS,SEPTRA DS) 800-160 MG per tablet; 1 tablet. After intercourse       She had both shingrix (august & nov 2019)

## 2020-04-27 DIAGNOSIS — R52 PAIN: ICD-10-CM

## 2020-04-27 DIAGNOSIS — F51.01 PRIMARY INSOMNIA: ICD-10-CM

## 2020-04-28 RX ORDER — GABAPENTIN 800 MG/1
TABLET ORAL
Qty: 90 TABLET | Refills: 0 | Status: SHIPPED | OUTPATIENT
Start: 2020-04-28 | End: 2020-07-24 | Stop reason: SDUPTHER

## 2020-07-24 ENCOUNTER — OFFICE VISIT (OUTPATIENT)
Dept: INTERNAL MEDICINE | Facility: CLINIC | Age: 55
End: 2020-07-24

## 2020-07-24 VITALS
OXYGEN SATURATION: 99 % | SYSTOLIC BLOOD PRESSURE: 130 MMHG | HEIGHT: 64 IN | BODY MASS INDEX: 24.92 KG/M2 | HEART RATE: 73 BPM | WEIGHT: 146 LBS | DIASTOLIC BLOOD PRESSURE: 84 MMHG

## 2020-07-24 DIAGNOSIS — F51.01 PRIMARY INSOMNIA: ICD-10-CM

## 2020-07-24 DIAGNOSIS — I10 ESSENTIAL HYPERTENSION: ICD-10-CM

## 2020-07-24 DIAGNOSIS — F41.9 ANXIETY: ICD-10-CM

## 2020-07-24 DIAGNOSIS — M54.2 NECK PAIN: Primary | ICD-10-CM

## 2020-07-24 DIAGNOSIS — N39.0 FREQUENT UTI: ICD-10-CM

## 2020-07-24 DIAGNOSIS — R52 PAIN: ICD-10-CM

## 2020-07-24 PROCEDURE — 99214 OFFICE O/P EST MOD 30 MIN: CPT | Performed by: INTERNAL MEDICINE

## 2020-07-24 RX ORDER — GABAPENTIN 800 MG/1
800 TABLET ORAL
Qty: 90 TABLET | Refills: 1 | Status: SHIPPED | OUTPATIENT
Start: 2020-07-24 | End: 2021-01-28

## 2020-07-24 RX ORDER — LORAZEPAM 1 MG/1
1 TABLET ORAL EVERY 8 HOURS PRN
Qty: 50 TABLET | Refills: 0 | Status: SHIPPED | OUTPATIENT
Start: 2020-07-24 | End: 2021-10-26 | Stop reason: SDUPTHER

## 2020-07-24 RX ORDER — HYDROCODONE BITARTRATE AND ACETAMINOPHEN 10; 325 MG/1; MG/1
1 TABLET ORAL EVERY 6 HOURS PRN
Qty: 30 TABLET | Refills: 0 | Status: SHIPPED | OUTPATIENT
Start: 2020-07-24 | End: 2020-07-27 | Stop reason: SDUPTHER

## 2020-07-24 RX ORDER — FLUOXETINE HYDROCHLORIDE 20 MG/1
20 CAPSULE ORAL DAILY
Qty: 30 CAPSULE | Refills: 5 | Status: SHIPPED | OUTPATIENT
Start: 2020-07-24 | End: 2020-08-18

## 2020-07-24 RX ORDER — LOSARTAN POTASSIUM AND HYDROCHLOROTHIAZIDE 12.5; 1 MG/1; MG/1
1 TABLET ORAL DAILY
Qty: 90 TABLET | Refills: 2 | Status: SHIPPED | OUTPATIENT
Start: 2020-07-24 | End: 2021-04-30 | Stop reason: SDUPTHER

## 2020-07-24 RX ORDER — ESTRADIOL 0.1 MG/G
2 CREAM VAGINAL 2 TIMES WEEKLY
Qty: 1 EACH | Refills: 6 | Status: SHIPPED | OUTPATIENT
Start: 2020-07-27 | End: 2020-07-27 | Stop reason: SDUPTHER

## 2020-07-24 NOTE — PROGRESS NOTES
"Geena Morrison is a 54 y.o. female here for   Chief Complaint   Patient presents with   • Hypertension   • Hyperlipidemia   • Anxiety   • Insomnia   .    Vitals:    07/24/20 0835   BP: 130/84   BP Location: Left arm   Patient Position: Sitting   Cuff Size: Adult   Pulse: 73   SpO2: 99%   Weight: 66.2 kg (146 lb)   Height: 163.2 cm (64.25\")       Body mass index is 24.87 kg/m².    Hypertension   This is a chronic problem. The current episode started more than 1 year ago. The problem is unchanged. The problem is controlled. Associated symptoms include anxiety and neck pain. Pertinent negatives include no chest pain, palpitations or shortness of breath.   Hyperlipidemia   This is a chronic problem. The current episode started more than 1 year ago. The problem is controlled. Recent lipid tests were reviewed and are normal. She has no history of diabetes. Pertinent negatives include no chest pain or shortness of breath.   Anxiety   Presents for follow-up visit. Symptoms include insomnia and nervous/anxious behavior. Patient reports no chest pain, palpitations or shortness of breath. Symptoms occur constantly. The severity of symptoms is moderate.       Insomnia   This is a chronic problem. The current episode started more than 1 year ago. The problem occurs constantly. The problem has been gradually worsening. Associated symptoms include fatigue and neck pain. Pertinent negatives include no chest pain, chills, coughing or fever.        The following portions of the patient's history were reviewed and updated as appropriate: allergies, current medications, past social history and problem list.    Review of Systems   Constitutional: Positive for fatigue. Negative for chills and fever.   Respiratory: Negative for cough, shortness of breath and wheezing.    Cardiovascular: Negative for chest pain, palpitations and leg swelling.   Musculoskeletal: Positive for neck pain and neck stiffness. "   Psychiatric/Behavioral: Positive for sleep disturbance. Negative for dysphoric mood. The patient is nervous/anxious and has insomnia.        Objective   Physical Exam   Constitutional: She appears well-developed and well-nourished. No distress.   Cardiovascular: Normal rate, regular rhythm and normal heart sounds.   Pulmonary/Chest: No respiratory distress. She has no wheezes. She has no rales. She exhibits no tenderness.   Musculoskeletal: She exhibits no edema.   Psychiatric: She has a normal mood and affect. Her behavior is normal.   Nursing note and vitals reviewed.      Assessment/Plan   Diagnoses and all orders for this visit:    Neck pain  Comments:  she was advised again that hydrocodone is addictive - use sparingly!    Pain  Comments:  Neck pain-need to use hydrocodone only for severe pain- consider repeat epidural injections which helped in the past.  Orders:  -     FLUoxetine (PROzac) 20 MG capsule; Take 1 capsule by mouth Daily.  -     HYDROcodone-acetaminophen (NORCO)  MG per tablet; Take 1 tablet by mouth Every 6 (Six) Hours As Needed for Moderate Pain .    Primary insomnia  Comments:  Okay to use occasional Ativan but never on the same day as hydrocodone (overdose potential discussed).  Orders:  -     FLUoxetine (PROzac) 20 MG capsule; Take 1 capsule by mouth Daily.  -     gabapentin (NEURONTIN) 800 MG tablet; Take 1 tablet by mouth every night at bedtime.    Frequent UTI  Comments:  Discussed need for topical estrogen to prevent bladder infections.  She may use an antibiotic after each intercourse to prevent infections.  Orders:  -     estrogens, conjugated, (PREMARIN) 0.625 MG tablet; Take 1 tablet by mouth Daily. Take daily for 21 days then do not take for 7 days.  -     ESTRACE VAGINAL 0.1 MG/GM vaginal cream; Insert 2 g into the vagina 2 (Two) Times a Week.    Anxiety  Comments:  stable - continue ativan sparingly (not daily)-trial of daily fluoxetine-call if problems.  Orders:  -      FLUoxetine (PROzac) 20 MG capsule; Take 1 capsule by mouth Daily.  -     LORazepam (ATIVAN) 1 MG tablet; Take 1 tablet by mouth Every 8 (Eight) Hours As Needed for Anxiety.    Essential hypertension  Comments:  Controlled-call if blood pressures over 130/80.  Orders:  -     losartan-hydrochlorothiazide (HYZAAR) 100-12.5 MG per tablet; Take 1 tablet by mouth Daily.

## 2020-07-27 DIAGNOSIS — F41.9 ANXIETY: ICD-10-CM

## 2020-07-27 DIAGNOSIS — N39.0 FREQUENT UTI: ICD-10-CM

## 2020-07-27 DIAGNOSIS — R52 PAIN: ICD-10-CM

## 2020-07-27 RX ORDER — ESTRADIOL 0.1 MG/G
2 CREAM VAGINAL 2 TIMES WEEKLY
Qty: 1 EACH | Refills: 6 | Status: SHIPPED | OUTPATIENT
Start: 2020-07-27 | End: 2021-04-30 | Stop reason: SDUPTHER

## 2020-07-27 RX ORDER — HYDROCODONE BITARTRATE AND ACETAMINOPHEN 10; 325 MG/1; MG/1
1 TABLET ORAL EVERY 6 HOURS PRN
Qty: 30 TABLET | Refills: 0 | Status: SHIPPED | OUTPATIENT
Start: 2020-07-27 | End: 2020-11-20 | Stop reason: SDUPTHER

## 2020-08-18 DIAGNOSIS — F41.9 ANXIETY: ICD-10-CM

## 2020-08-18 DIAGNOSIS — F51.01 PRIMARY INSOMNIA: ICD-10-CM

## 2020-08-18 DIAGNOSIS — R52 PAIN: ICD-10-CM

## 2020-08-18 RX ORDER — FLUOXETINE HYDROCHLORIDE 40 MG/1
40 CAPSULE ORAL DAILY
Qty: 30 CAPSULE | Refills: 6 | Status: SHIPPED | OUTPATIENT
Start: 2020-08-18 | End: 2020-09-16 | Stop reason: SDUPTHER

## 2020-09-11 ENCOUNTER — TELEPHONE (OUTPATIENT)
Dept: INTERNAL MEDICINE | Facility: CLINIC | Age: 55
End: 2020-09-11

## 2020-09-11 NOTE — TELEPHONE ENCOUNTER
PATIENT STATES: that she is getting a covid-19 test. She would need a note in mychart saying that she made contact with covid-19 but show no symptoms please advise     PATIENT CAN BE REACHED ON:  [phone]

## 2020-09-16 ENCOUNTER — PATIENT MESSAGE (OUTPATIENT)
Dept: INTERNAL MEDICINE | Facility: CLINIC | Age: 55
End: 2020-09-16

## 2020-09-16 DIAGNOSIS — R52 PAIN: ICD-10-CM

## 2020-09-16 DIAGNOSIS — F51.01 PRIMARY INSOMNIA: ICD-10-CM

## 2020-09-16 DIAGNOSIS — F41.9 ANXIETY: ICD-10-CM

## 2020-09-16 RX ORDER — FLUOXETINE HYDROCHLORIDE 40 MG/1
40 CAPSULE ORAL DAILY
Qty: 90 CAPSULE | Refills: 1 | Status: SHIPPED | OUTPATIENT
Start: 2020-09-16 | End: 2020-11-04 | Stop reason: DRUGHIGH

## 2020-09-16 NOTE — TELEPHONE ENCOUNTER
From: Adela Morrison  To: Jamila Huston MD  Sent: 9/16/2020 9:07 AM EDT  Subject: Non-Urgent Medical Question    Per your request, I've taken the Fluoxetine HCL 40 mg for a month and it seems to be doing ok. No side effects. Can you send a three-month (90) day prescription to:    [facility] [id number] [place] [address] [phone]

## 2020-11-04 DIAGNOSIS — F41.9 ANXIETY: ICD-10-CM

## 2020-11-04 DIAGNOSIS — F51.01 PRIMARY INSOMNIA: ICD-10-CM

## 2020-11-04 DIAGNOSIS — R52 PAIN: ICD-10-CM

## 2020-11-04 RX ORDER — FLUOXETINE HYDROCHLORIDE 20 MG/1
20 CAPSULE ORAL DAILY
Qty: 30 CAPSULE | Refills: 5 | Status: CANCELLED | OUTPATIENT
Start: 2020-11-04

## 2020-11-05 ENCOUNTER — TELEPHONE (OUTPATIENT)
Dept: INTERNAL MEDICINE | Facility: CLINIC | Age: 55
End: 2020-11-05

## 2020-11-05 RX ORDER — FLUOXETINE HYDROCHLORIDE 20 MG/1
20 CAPSULE ORAL DAILY
Qty: 90 CAPSULE | Refills: 1 | Status: SHIPPED | OUTPATIENT
Start: 2020-11-05 | End: 2021-04-30 | Stop reason: DRUGHIGH

## 2020-11-05 NOTE — TELEPHONE ENCOUNTER
Dr. Huston, there was a message in advise requests for her to go back on prozac 20 mg, but she has sent us a message that she does not have any of that and would like sent in.

## 2020-11-05 NOTE — TELEPHONE ENCOUNTER
PA for Estrace submitted to [email or web address] and approved for coverage on 10/19/2020

## 2020-11-20 ENCOUNTER — OFFICE VISIT (OUTPATIENT)
Dept: INTERNAL MEDICINE | Facility: CLINIC | Age: 55
End: 2020-11-20

## 2020-11-20 VITALS
OXYGEN SATURATION: 99 % | HEIGHT: 64 IN | WEIGHT: 148 LBS | SYSTOLIC BLOOD PRESSURE: 128 MMHG | BODY MASS INDEX: 25.27 KG/M2 | DIASTOLIC BLOOD PRESSURE: 84 MMHG | HEART RATE: 63 BPM | TEMPERATURE: 98.4 F

## 2020-11-20 DIAGNOSIS — Z23 NEED FOR VACCINATION: ICD-10-CM

## 2020-11-20 DIAGNOSIS — R52 PAIN: ICD-10-CM

## 2020-11-20 DIAGNOSIS — F41.9 ANXIETY: ICD-10-CM

## 2020-11-20 DIAGNOSIS — F51.01 PRIMARY INSOMNIA: ICD-10-CM

## 2020-11-20 DIAGNOSIS — I10 ESSENTIAL HYPERTENSION: Primary | ICD-10-CM

## 2020-11-20 PROCEDURE — 90686 IIV4 VACC NO PRSV 0.5 ML IM: CPT | Performed by: INTERNAL MEDICINE

## 2020-11-20 PROCEDURE — 90471 IMMUNIZATION ADMIN: CPT | Performed by: INTERNAL MEDICINE

## 2020-11-20 PROCEDURE — 99214 OFFICE O/P EST MOD 30 MIN: CPT | Performed by: INTERNAL MEDICINE

## 2020-11-20 RX ORDER — HYDROCODONE BITARTRATE AND ACETAMINOPHEN 10; 325 MG/1; MG/1
1 TABLET ORAL EVERY 6 HOURS PRN
Qty: 30 TABLET | Refills: 0 | Status: SHIPPED | OUTPATIENT
Start: 2020-11-20 | End: 2021-04-30 | Stop reason: SDUPTHER

## 2020-11-20 RX ORDER — CONJUGATED ESTROGENS 0.62 MG/1
TABLET, FILM COATED ORAL
COMMUNITY
Start: 2020-10-17 | End: 2021-01-11

## 2020-11-20 RX ORDER — HYDROXYZINE HYDROCHLORIDE 25 MG/1
TABLET, FILM COATED ORAL
COMMUNITY
Start: 2020-09-02 | End: 2020-11-20

## 2020-11-20 NOTE — PROGRESS NOTES
"Geena Morriosn is a 55 y.o. female here for   Chief Complaint   Patient presents with   • Hypertension   • Hyperlipidemia   • Vitamin D Deficiency   • Insomnia   .    Vitals:    11/20/20 1431   BP: 128/84   BP Location: Left arm   Patient Position: Sitting   Cuff Size: Adult   Pulse: 63   Temp: 98.4 °F (36.9 °C)   SpO2: 99%   Weight: 67.1 kg (148 lb)   Height: 163.2 cm (64.25\")       Body mass index is 25.21 kg/m².    Hypertension  This is a chronic problem. The current episode started more than 1 year ago. The problem is unchanged. The problem is controlled. Associated symptoms include neck pain. Pertinent negatives include no chest pain, palpitations or shortness of breath.   Hyperlipidemia  This is a chronic problem. The current episode started more than 1 year ago. The problem is controlled. Recent lipid tests were reviewed and are normal. Pertinent negatives include no chest pain or shortness of breath.   Insomnia  This is a chronic problem. The current episode started more than 1 year ago. The problem occurs constantly. The problem has been unchanged. Associated symptoms include neck pain. Pertinent negatives include no chest pain, chills, coughing, fatigue or fever.        The following portions of the patient's history were reviewed and updated as appropriate: allergies, current medications, past social history and problem list.    Review of Systems   Constitutional: Negative for chills, fatigue and fever.   Respiratory: Negative for cough, shortness of breath and wheezing.    Cardiovascular: Negative for chest pain, palpitations and leg swelling.   Musculoskeletal: Positive for back pain, neck pain and neck stiffness.   Psychiatric/Behavioral: Positive for sleep disturbance. Negative for dysphoric mood. The patient has insomnia. The patient is not nervous/anxious.        Objective   Physical Exam  Vitals signs and nursing note reviewed.   Constitutional:       General: She is not in acute " distress.     Appearance: She is well-developed.   Cardiovascular:      Rate and Rhythm: Normal rate and regular rhythm.      Heart sounds: Normal heart sounds.   Pulmonary:      Effort: No respiratory distress.      Breath sounds: No wheezing or rales.   Chest:      Chest wall: No tenderness.   Psychiatric:         Behavior: Behavior normal.         Assessment/Plan   Diagnoses and all orders for this visit:    Essential hypertension  Comments:  controlled - call if bp over 130/80    Pain  Comments:  Neck pain-need to use hydrocodone only for severe pain- consider repeat epidural injections which helped in the past.  Orders:  -     HYDROcodone-acetaminophen (NORCO)  MG per tablet; Take 1 tablet by mouth Every 6 (Six) Hours As Needed for Moderate Pain .    Anxiety  Comments:  better with prozac but can't use adeq dose b/c hair loss -she uses rare ativan now but anxiety still causes insomnia -need evaluation by psych BC     Primary insomnia  Comments:  Take several hours for gabapentin to work-need trial of taking gabapentin early in the evening- call if problems    Need for vaccination  -     FluLaval Quad >6 Months (2424-4970)    Other orders  -     Discontinue: hydrOXYzine (ATARAX) 25 MG tablet  -     Premarin 0.625 MG tablet

## 2021-01-11 RX ORDER — CONJUGATED ESTROGENS 0.62 MG/1
TABLET, FILM COATED ORAL
Qty: 63 TABLET | Refills: 0 | Status: SHIPPED | OUTPATIENT
Start: 2021-01-11 | End: 2021-04-09

## 2021-01-28 DIAGNOSIS — F51.01 PRIMARY INSOMNIA: ICD-10-CM

## 2021-01-28 RX ORDER — GABAPENTIN 800 MG/1
TABLET ORAL
Qty: 90 TABLET | Refills: 0 | Status: SHIPPED | OUTPATIENT
Start: 2021-01-28 | End: 2021-04-30 | Stop reason: SDUPTHER

## 2021-03-24 ENCOUNTER — BULK ORDERING (OUTPATIENT)
Dept: CASE MANAGEMENT | Facility: OTHER | Age: 56
End: 2021-03-24

## 2021-03-24 DIAGNOSIS — Z23 IMMUNIZATION DUE: ICD-10-CM

## 2021-04-09 RX ORDER — CONJUGATED ESTROGENS 0.62 MG/1
TABLET, FILM COATED ORAL
Qty: 63 TABLET | Refills: 3 | Status: SHIPPED | OUTPATIENT
Start: 2021-04-09 | End: 2021-04-30

## 2021-04-13 DIAGNOSIS — R52 PAIN: ICD-10-CM

## 2021-04-13 RX ORDER — HYDROCODONE BITARTRATE AND ACETAMINOPHEN 10; 325 MG/1; MG/1
1 TABLET ORAL EVERY 6 HOURS PRN
Qty: 30 TABLET | Refills: 0 | Status: CANCELLED | OUTPATIENT
Start: 2021-04-13

## 2021-04-15 ENCOUNTER — PATIENT MESSAGE (OUTPATIENT)
Dept: INTERNAL MEDICINE | Facility: CLINIC | Age: 56
End: 2021-04-15

## 2021-04-15 DIAGNOSIS — R52 PAIN: ICD-10-CM

## 2021-04-15 RX ORDER — HYDROCODONE BITARTRATE AND ACETAMINOPHEN 10; 325 MG/1; MG/1
1 TABLET ORAL EVERY 6 HOURS PRN
Qty: 30 TABLET | Refills: 0 | OUTPATIENT
Start: 2021-04-15

## 2021-04-16 DIAGNOSIS — I10 ESSENTIAL HYPERTENSION: ICD-10-CM

## 2021-04-16 RX ORDER — LOSARTAN POTASSIUM AND HYDROCHLOROTHIAZIDE 12.5; 1 MG/1; MG/1
TABLET ORAL
Qty: 90 TABLET | Refills: 1 | OUTPATIENT
Start: 2021-04-16

## 2021-04-16 NOTE — TELEPHONE ENCOUNTER
From: APOORVA CARLISLE  To: Adela Morrison  Sent: 4/15/2021 4:09 PM EDT  Subject: Your refill request for Hydrocodone    Per Dr Huston  Need appt every 4 mos - she will need appt prior to refill of hydrocodone

## 2021-04-30 ENCOUNTER — OFFICE VISIT (OUTPATIENT)
Dept: INTERNAL MEDICINE | Facility: CLINIC | Age: 56
End: 2021-04-30

## 2021-04-30 VITALS
DIASTOLIC BLOOD PRESSURE: 96 MMHG | SYSTOLIC BLOOD PRESSURE: 158 MMHG | BODY MASS INDEX: 25.38 KG/M2 | WEIGHT: 149 LBS | TEMPERATURE: 98.7 F

## 2021-04-30 DIAGNOSIS — N39.0 FREQUENT UTI: ICD-10-CM

## 2021-04-30 DIAGNOSIS — G89.4 CHRONIC PAIN SYNDROME: ICD-10-CM

## 2021-04-30 DIAGNOSIS — G47.00 INSOMNIA, UNSPECIFIED TYPE: ICD-10-CM

## 2021-04-30 DIAGNOSIS — I10 ESSENTIAL HYPERTENSION: Primary | ICD-10-CM

## 2021-04-30 DIAGNOSIS — M54.2 NECK PAIN: ICD-10-CM

## 2021-04-30 DIAGNOSIS — F41.9 ANXIETY: ICD-10-CM

## 2021-04-30 DIAGNOSIS — R52 PAIN: ICD-10-CM

## 2021-04-30 DIAGNOSIS — R73.09 ELEVATED GLUCOSE: ICD-10-CM

## 2021-04-30 DIAGNOSIS — E78.49 OTHER HYPERLIPIDEMIA: ICD-10-CM

## 2021-04-30 PROCEDURE — 99214 OFFICE O/P EST MOD 30 MIN: CPT | Performed by: INTERNAL MEDICINE

## 2021-04-30 RX ORDER — ESTRADIOL 0.1 MG/G
2 CREAM VAGINAL 2 TIMES WEEKLY
Qty: 1 EACH | Refills: 6 | Status: SHIPPED | OUTPATIENT
Start: 2021-05-03 | End: 2021-10-26 | Stop reason: SDUPTHER

## 2021-04-30 RX ORDER — HYDROCODONE BITARTRATE AND ACETAMINOPHEN 10; 325 MG/1; MG/1
1 TABLET ORAL EVERY 6 HOURS PRN
Qty: 30 TABLET | Refills: 0 | Status: SHIPPED | OUTPATIENT
Start: 2021-04-30 | End: 2021-04-30 | Stop reason: SDUPTHER

## 2021-04-30 RX ORDER — LOSARTAN POTASSIUM AND HYDROCHLOROTHIAZIDE 12.5; 1 MG/1; MG/1
1 TABLET ORAL DAILY
Qty: 90 TABLET | Refills: 2 | Status: SHIPPED | OUTPATIENT
Start: 2021-04-30 | End: 2021-10-26 | Stop reason: SDUPTHER

## 2021-04-30 RX ORDER — HYDROCODONE BITARTRATE AND ACETAMINOPHEN 10; 325 MG/1; MG/1
1 TABLET ORAL EVERY 8 HOURS PRN
Qty: 30 TABLET | Refills: 0 | Status: SHIPPED | OUTPATIENT
Start: 2021-04-30 | End: 2021-07-23 | Stop reason: SDUPTHER

## 2021-04-30 RX ORDER — FLUOXETINE HYDROCHLORIDE 40 MG/1
1 CAPSULE ORAL EVERY OTHER DAY
COMMUNITY
Start: 2021-04-15 | End: 2021-04-30

## 2021-04-30 RX ORDER — AMLODIPINE BESYLATE 5 MG/1
5 TABLET ORAL DAILY
Qty: 30 TABLET | Refills: 3 | Status: SHIPPED | OUTPATIENT
Start: 2021-04-30 | End: 2021-05-06 | Stop reason: SDUPTHER

## 2021-04-30 RX ORDER — FLUOXETINE HYDROCHLORIDE 20 MG/1
CAPSULE ORAL
Qty: 180 CAPSULE | Refills: 2 | Status: SHIPPED | OUTPATIENT
Start: 2021-04-30 | End: 2021-10-26 | Stop reason: SDUPTHER

## 2021-04-30 RX ORDER — BACLOFEN 10 MG/1
10 TABLET ORAL 3 TIMES DAILY
Qty: 30 TABLET | Refills: 6 | Status: SHIPPED | OUTPATIENT
Start: 2021-04-30 | End: 2022-08-01

## 2021-04-30 RX ORDER — FLUOXETINE HYDROCHLORIDE 20 MG/1
20 CAPSULE ORAL EVERY OTHER DAY
COMMUNITY
End: 2021-04-30

## 2021-04-30 RX ORDER — GABAPENTIN 800 MG/1
800 TABLET ORAL
Qty: 90 TABLET | Refills: 1 | Status: SHIPPED | OUTPATIENT
Start: 2021-04-30 | End: 2021-05-31 | Stop reason: SDUPTHER

## 2021-04-30 NOTE — PROGRESS NOTES
Chief Complaint  Anxiety, Hypertension (not checked recently), Neck Pain, and Insomnia    Subjective          Adela Morrison presents to [facility] for   Anxiety  Presents for follow-up visit. Symptoms include excessive worry and insomnia. Patient reports no depressed mood. Symptoms occur most days.       Hypertension  This is a chronic problem. The current episode started more than 1 year ago. The problem has been rapidly worsening since onset. The problem is uncontrolled. Associated symptoms include anxiety and neck pain.   Neck Pain   This is a chronic problem. The current episode started more than 1 year ago. The problem occurs constantly. The problem has been unchanged. The quality of the pain is described as aching.   Insomnia  This is a chronic problem. The current episode started more than 1 year ago. The problem occurs constantly. The problem has been unchanged. Associated symptoms include neck pain.       Review of Systems   Musculoskeletal: Positive for neck pain.   Psychiatric/Behavioral: The patient has insomnia.         Objective   Vital Signs:   /96   Temp 98.7 °F (37.1 °C)   Wt 67.6 kg (149 lb)   BMI 25.38 kg/m²     Physical Exam  Vitals and nursing note reviewed.   Constitutional:       General: She is not in acute distress.     Appearance: She is well-developed.   Cardiovascular:      Rate and Rhythm: Normal rate and regular rhythm.      Heart sounds: Normal heart sounds.   Pulmonary:      Effort: No respiratory distress.      Breath sounds: No wheezing or rales.   Chest:      Chest wall: No tenderness.   Psychiatric:         Behavior: Behavior normal.        Result Review :                 Assessment and Plan    Problem List Items Addressed This Visit        Unprioritized    Hypertension - Primary    Current Assessment & Plan     Hypertension is worsening.  Continue current treatment regimen.  Dietary sodium restriction.  Weight loss.  Regular aerobic  exercise.  Blood pressure will be reassessed in 3 months.         Relevant Medications    losartan-hydrochlorothiazide (HYZAAR) 100-12.5 MG per tablet    amLODIPine (NORVASC) 5 MG tablet    Insomnia    Current Assessment & Plan     Trial of increased fluoxetine         Relevant Medications    gabapentin (NEURONTIN) 800 MG tablet    Anxiety    Current Assessment & Plan     Increased fluoxetine from 20mg to alt with 40mg qod -ok to take 2 daily         Relevant Medications    FLUoxetine (PROzac) 20 MG capsule    Elevated glucose    Current Assessment & Plan     Need low sugar diet         Neck pain    Current Assessment & Plan     Need MRI & referral to pain management (injections helped in the past)         Relevant Medications    baclofen (LIORESAL) 10 MG tablet    HYDROcodone-acetaminophen (NORCO)  MG per tablet    Other Relevant Orders    MRI Cervical Spine Without Contrast    Ambulatory Referral to Hospital Pain Management Department    Chronic pain    Overview     Neck pain         Other hyperlipidemia    Current Assessment & Plan     Lipid abnormalities are unchanged.  Nutritional counseling was provided.  Lipids will be reassessed in 3 months.         Pain    Overview     need to use hydrocodone only for signif pain         Relevant Medications    HYDROcodone-acetaminophen (NORCO)  MG per tablet    Frequent UTI    Relevant Medications    estradiol (ESTRACE VAGINAL) 0.1 MG/GM vaginal cream (Start on 5/3/2021)          Follow Up   Return in about 3 months (around 7/30/2021) for Recheck.  Patient was given instructions and counseling regarding her condition or for health maintenance advice. Please see specific information pulled into the AVS if appropriate.      Report blood pressure readings next week.

## 2021-05-06 ENCOUNTER — TELEPHONE (OUTPATIENT)
Dept: INTERNAL MEDICINE | Facility: CLINIC | Age: 56
End: 2021-05-06

## 2021-05-06 RX ORDER — AMLODIPINE BESYLATE 5 MG/1
TABLET ORAL
Qty: 30 TABLET | Refills: 3 | Status: SHIPPED | OUTPATIENT
Start: 2021-05-06 | End: 2021-10-26 | Stop reason: SDUPTHER

## 2021-05-31 ENCOUNTER — TELEPHONE (OUTPATIENT)
Dept: INTERNAL MEDICINE | Facility: CLINIC | Age: 56
End: 2021-05-31

## 2021-05-31 DIAGNOSIS — G47.00 INSOMNIA, UNSPECIFIED TYPE: ICD-10-CM

## 2021-05-31 DIAGNOSIS — M54.2 NECK PAIN: Primary | ICD-10-CM

## 2021-06-01 RX ORDER — GABAPENTIN 800 MG/1
800 TABLET ORAL
Qty: 90 TABLET | Refills: 1 | Status: SHIPPED | OUTPATIENT
Start: 2021-06-01 | End: 2021-10-26 | Stop reason: SDUPTHER

## 2021-06-04 ENCOUNTER — APPOINTMENT (OUTPATIENT)
Dept: MRI IMAGING | Facility: HOSPITAL | Age: 56
End: 2021-06-04

## 2021-06-09 ENCOUNTER — APPOINTMENT (OUTPATIENT)
Dept: PAIN MEDICINE | Facility: HOSPITAL | Age: 56
End: 2021-06-09

## 2021-06-10 ENCOUNTER — APPOINTMENT (OUTPATIENT)
Dept: PAIN MEDICINE | Facility: HOSPITAL | Age: 56
End: 2021-06-10

## 2021-07-23 ENCOUNTER — OFFICE VISIT (OUTPATIENT)
Dept: INTERNAL MEDICINE | Facility: CLINIC | Age: 56
End: 2021-07-23

## 2021-07-23 ENCOUNTER — APPOINTMENT (OUTPATIENT)
Dept: WOMENS IMAGING | Facility: HOSPITAL | Age: 56
End: 2021-07-23

## 2021-07-23 VITALS
DIASTOLIC BLOOD PRESSURE: 78 MMHG | BODY MASS INDEX: 25.78 KG/M2 | SYSTOLIC BLOOD PRESSURE: 124 MMHG | WEIGHT: 151 LBS | HEART RATE: 73 BPM | HEIGHT: 64 IN | OXYGEN SATURATION: 99 % | TEMPERATURE: 98.6 F

## 2021-07-23 DIAGNOSIS — I10 ESSENTIAL HYPERTENSION: Primary | ICD-10-CM

## 2021-07-23 DIAGNOSIS — R73.09 ELEVATED GLUCOSE: ICD-10-CM

## 2021-07-23 DIAGNOSIS — R52 PAIN: ICD-10-CM

## 2021-07-23 DIAGNOSIS — M54.2 NECK PAIN: ICD-10-CM

## 2021-07-23 DIAGNOSIS — F41.9 ANXIETY: ICD-10-CM

## 2021-07-23 DIAGNOSIS — E78.49 OTHER HYPERLIPIDEMIA: ICD-10-CM

## 2021-07-23 PROCEDURE — 72050 X-RAY EXAM NECK SPINE 4/5VWS: CPT | Performed by: INTERNAL MEDICINE

## 2021-07-23 PROCEDURE — 72070 X-RAY EXAM THORAC SPINE 2VWS: CPT | Performed by: RADIOLOGY

## 2021-07-23 PROCEDURE — 99213 OFFICE O/P EST LOW 20 MIN: CPT | Performed by: INTERNAL MEDICINE

## 2021-07-23 RX ORDER — HYDROCODONE BITARTRATE AND ACETAMINOPHEN 10; 325 MG/1; MG/1
1 TABLET ORAL EVERY 8 HOURS PRN
Qty: 30 TABLET | Refills: 0 | Status: SHIPPED | OUTPATIENT
Start: 2021-07-23 | End: 2021-09-20 | Stop reason: SDUPTHER

## 2021-07-23 NOTE — PROGRESS NOTES
"Chief Complaint  Hypertension (3 month follow up), Anxiety, Hyperlipidemia, and Neck Pain    Subjective          Adela Morrison presents to [facility] for   Hypertension  This is a chronic problem. The current episode started more than 1 year ago. The problem is unchanged. The problem is controlled. Associated symptoms include anxiety and neck pain.   Anxiety  Presents for follow-up visit. Symptoms include excessive worry and nervous/anxious behavior. Patient reports no depressed mood.       Hyperlipidemia  This is a chronic problem. The current episode started more than 1 year ago. The problem is controlled.   Neck Pain   This is a chronic problem. The current episode started more than 1 year ago. The problem occurs constantly. The problem has been unchanged.       Review of Systems   Musculoskeletal: Positive for neck pain.   Psychiatric/Behavioral: The patient is nervous/anxious.         Objective   Vital Signs:   /78 (BP Location: Left arm, Patient Position: Sitting, Cuff Size: Adult)   Pulse 73   Temp 98.6 °F (37 °C)   Ht 163.2 cm (64.25\")   Wt 68.5 kg (151 lb)   SpO2 99%   BMI 25.72 kg/m²     Physical Exam  Vitals and nursing note reviewed.   Constitutional:       General: She is not in acute distress.     Appearance: She is well-developed.   Cardiovascular:      Rate and Rhythm: Normal rate and regular rhythm.      Heart sounds: Normal heart sounds.   Pulmonary:      Effort: No respiratory distress.      Breath sounds: No wheezing or rales.   Chest:      Chest wall: No tenderness.   Psychiatric:         Behavior: Behavior normal.        Result Review :                 Assessment and Plan {CC Problem List  Visit Diagnosis  ROS  Review (Popup)  Health Maintenance  Quality  BestPractice  Medications  SmartSets  SnapShot Encounters  Media :23}   Problem List Items Addressed This Visit        Unprioritized    Hypertension - Primary    Current Assessment & Plan "     Hypertension is improving with treatment.  Continue current treatment regimen.  Dietary sodium restriction.  Weight loss.  Regular aerobic exercise.  Continue current medications.  Blood pressure will be reassessed at the next regular appointment.         Anxiety    Current Assessment & Plan     Controlled -call if sx.         Elevated glucose    Current Assessment & Plan     Need low sugar diet.         Neck pain    Overview     Epidural injections helped in 2011.         Current Assessment & Plan     Need MRI & repeat epidural injections.         Relevant Medications    HYDROcodone-acetaminophen (NORCO)  MG per tablet    Other Relevant Orders    MRI Cervical Spine Without Contrast    XR Spine Cervical Complete 4 or 5 View (In Office) (Completed)    Ambulatory Referral to Hospital Pain Management Department    Other hyperlipidemia    Pain    Overview     need to use hydrocodone only for signif pain         Relevant Medications    HYDROcodone-acetaminophen (NORCO)  MG per tablet          Follow Up   No follow-ups on file.  Patient was given instructions and counseling regarding her condition or for health maintenance advice. Please see specific information pulled into the AVS if appropriate.

## 2021-09-09 ENCOUNTER — TELEPHONE (OUTPATIENT)
Dept: INTERNAL MEDICINE | Facility: CLINIC | Age: 56
End: 2021-09-09

## 2021-09-09 NOTE — TELEPHONE ENCOUNTER
Caller: MURALI    Relationship: INSURANCE    Best call back number: [phone]    Who are you requesting to speak with (clinical staff, provider,  specific staff member):     What was the call regarding: MURALI NEEDS THE REFERRAL FOR MRI OF CERVICAL SPINE     Do you require a callback: YES    PLEASE FAX THE ORDER TO: [phone]  [facility] [place], [place]

## 2021-09-13 ENCOUNTER — TELEPHONE (OUTPATIENT)
Dept: INTERNAL MEDICINE | Facility: CLINIC | Age: 56
End: 2021-09-13

## 2021-09-13 NOTE — TELEPHONE ENCOUNTER
Caller: SAROJ ALLEN ZAINAEDS      Best call back number: [phone]    What is the best time to reach you: ANY    Who are you requesting to speak with (clinical staff, provider,  specific staff member) CLINICAL    Do you require a callback: NO.  PLEASE MAKE SURE THAT THIS PATIENT'S ORDER WAS FAXED OVER TO [facility] IN [place] FAX # [phone]  REF  # IS THE PRIOR AUTHORIZATION NUMBER [id number].  AS OF TODAY THEY STILL HAVE NOT RECEIVED THE PATIENT'S ORDER.

## 2021-09-17 ENCOUNTER — HOSPITAL ENCOUNTER (OUTPATIENT)
Dept: MRI IMAGING | Facility: HOSPITAL | Age: 56
Discharge: HOME | End: 2021-09-17
Admitting: INTERNAL MEDICINE

## 2021-09-17 DIAGNOSIS — M54.2 NECK PAIN: ICD-10-CM

## 2021-09-17 PROCEDURE — 72141 MRI NECK SPINE W/O DYE: CPT

## 2021-09-20 DIAGNOSIS — M54.2 NECK PAIN: Primary | ICD-10-CM

## 2021-09-20 NOTE — PROGRESS NOTES
Pls call - need repeat epidural injections (helped in the past).  Will refer to neurosurgeon if any weakness of extremities.

## 2021-09-24 ENCOUNTER — ANESTHESIA (OUTPATIENT)
Dept: PAIN MEDICINE | Facility: HOSPITAL | Age: 56
End: 2021-09-24

## 2021-09-24 ENCOUNTER — ANESTHESIA EVENT (OUTPATIENT)
Dept: PAIN MEDICINE | Facility: HOSPITAL | Age: 56
End: 2021-09-24

## 2021-09-24 ENCOUNTER — HOSPITAL ENCOUNTER (OUTPATIENT)
Dept: PAIN MEDICINE | Facility: HOSPITAL | Age: 56
Discharge: HOME | End: 2021-09-24
Admitting: ANESTHESIOLOGY

## 2021-09-24 DIAGNOSIS — M48.02 FORAMINAL STENOSIS OF CERVICAL REGION: Primary | ICD-10-CM

## 2021-09-24 DIAGNOSIS — M54.2 NECK PAIN: ICD-10-CM

## 2021-09-24 PROCEDURE — G0463 HOSPITAL OUTPT CLINIC VISIT: HCPCS

## 2021-09-24 NOTE — ANESTHESIA PROCEDURE NOTES
Consult  Consult performed by: Deisy Murray MD  Consult ordered by: Deisy Murray MD  Reason for consult: neck pain  Assessment/Recommendations: Consult only today.  No procedure performed.  Plan for YVAN @ C5/6 for stenosis.

## 2021-09-24 NOTE — H&P
[facility]    History and Physical    Patient Name: Adela Morrison  :  1965  MRN:  [id number]  Date of Admission: 2021    Subjective     Patient is a 55 y.o. female presents with chief complaint of chronic, moderate neck pain.  Onset of symptoms was gradual starting a few years ago.  Symptoms are associated/aggravated by nothing in particular. Symptoms improve with nothing.  H/o neck pain about 10 years ago that was similar in nature to the pain she is now having.  At that time she had an epidural series of 3 w/ good results.  Over the past few years though, the pain has been increasing.  It's the middle of her neck, w/ R>L sided pain. No radiation, numbness, tingling, weakness.  Will plan for a YVAN  @ C5/6.         Recent MRI: IMPRESSION:   At C 5-6, there is a mild to moderate degree of right and a severe  degree of left foraminal narrowing secondary to uncovertebral joint  hypertrophy. A mild degree of canal stenosis is seen at C5-6.     At C6-7, there is an mild to moderate degree of left foraminal narrowing  secondary to uncovertebral joint hypertrophy. A disc osteophyte complex  results in minimal degree of canal narrowing.     This report was finalized on 2021 10:29 AM by Dr. Valdemar Echeverria M.D.    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   • Chronic pain    • Depression (emotion)    • Diabetes mellitus (CMS/HCC) gestational   • Female bladder prolapse    • Frequent UTI 2020   • Hypertension    • Hypothyroidism    • Insomnia     TAKES LEXAPRO   • Mood disorder (CMS/HCC)    • Other hyperlipidemia 10/5/2018   •  Prolapse of uterus      Past Surgical History:   Past Surgical History:   Procedure Laterality Date   • BREAST AUGMENTATION     • COLONOSCOPY N/A 4/18/2016    Procedure: COLONOSCOPY;  Surgeon: Vasu Bridges MD;  Location: [facility] ENDOSCOPY;  Service:    • COSMETIC SURGERY     • HYSTERECTOMY     • RHINOPLASTY     • TONSILLECTOMY     • TUBAL ABDOMINAL LIGATION       Family History:   Family History   Problem Relation Age of Onset   • Diabetes Paternal Grandfather    • Hyperlipidemia Mother    • Hyperlipidemia Father    • Hyperlipidemia Brother      Social History:   Social History     Socioeconomic History   • Marital status: Single     Spouse name: Not on file   • Number of children: Not on file   • Years of education: Not on file   • Highest education level: Not on file   Tobacco Use   • Smoking status: Never Smoker   • Smokeless tobacco: Never Used   Substance and Sexual Activity   • Alcohol use: Yes     Types: 5 Standard drinks or equivalent per week     Comment: SOCIALLY   • Drug use: Yes     Types: Hydrocodone, Benzodiazepines, Other     Comment: Prescribed Gabapentin/Lorazepam   • Sexual activity: Yes     Partners: Male     Birth control/protection: None       Vital Signs Range for the last 24 hours  Temperature:     Temp Source:     BP:     Pulse:     Respirations:     SPO2:     O2 Amount (l/min):     O2 Devices     Weight:           --------------------------------------------------------------------------------    Current Outpatient Medications   Medication Sig Dispense Refill   • amLODIPine (NORVASC) 5 MG tablet 1/2 daily if bp over 130/80 for 2 days... then increase to 1 po daily if bp still over 130/80 30 tablet 3   • baclofen (LIORESAL) 10 MG tablet Take 1 tablet by mouth 3 (Three) Times a Day. 30 tablet 6   • estradiol (ESTRACE VAGINAL) 0.1 MG/GM vaginal cream Insert 2 g into the vagina 2 (Two) Times a Week. 1 each 6   • estrogens, conjugated, (Premarin) 0.625 MG tablet Take 1 tablet by mouth Daily.  Take daily for 21 days then do not take for 7 days. 63 tablet 2   • FLUoxetine (PROzac) 20 MG capsule 2 daily 180 capsule 2   • gabapentin (NEURONTIN) 800 MG tablet Take 1 tablet by mouth every night at bedtime. 90 tablet 1   • HYDROcodone-acetaminophen (NORCO)  MG per tablet Take 1 tablet by mouth Every 8 (Eight) Hours As Needed for Moderate Pain . Need to use sparingly- this med is addictive! 20 tablet 0   • LORazepam (ATIVAN) 1 MG tablet Take 1 tablet by mouth Every 8 (Eight) Hours As Needed for Anxiety. 50 tablet 0   • losartan-hydrochlorothiazide (HYZAAR) 100-12.5 MG per tablet Take 1 tablet by mouth Daily. 90 tablet 2     No current facility-administered medications for this encounter.       --------------------------------------------------------------------------------  Assessment/Plan      Anesthesia Evaluation     Patient summary reviewed and Nursing notes reviewed   no history of anesthetic complications:  NPO Solid Status: > 8 hours  NPO Liquid Status: > 2 hours           Airway   Mallampati: II  Dental      Pulmonary    (+) asthma,  Cardiovascular   Exercise tolerance: good (4-7 METS)    (+) hypertension, hyperlipidemia,       Neuro/Psych  (+) psychiatric history Anxiety and Depression,     GI/Hepatic/Renal/Endo    (+)   diabetes mellitus, thyroid problem hypothyroidism    Musculoskeletal     (+) chronic pain, neck pain,   Abdominal    Substance History   (+) drug use     OB/GYN negative ob/gyn ROS         Other      history of cancer               Diagnosis and Plan    Treatment Plan  ASA 2      Procedures: Cervical Epidural Steroid Injection(YVAN), With fluoroscopy,               Diagnosis     * Foraminal stenosis of cervical region [M48.02]     * Neck pain, chronic [M54.2, G89.29]

## 2021-10-06 ENCOUNTER — APPOINTMENT (OUTPATIENT)
Dept: PAIN MEDICINE | Facility: HOSPITAL | Age: 56
End: 2021-10-06

## 2021-10-25 DIAGNOSIS — Z12.31 ENCOUNTER FOR SCREENING MAMMOGRAM FOR BREAST CANCER: Primary | ICD-10-CM

## 2021-10-26 ENCOUNTER — APPOINTMENT (OUTPATIENT)
Dept: WOMENS IMAGING | Facility: HOSPITAL | Age: 56
End: 2021-10-26

## 2021-10-26 ENCOUNTER — OFFICE VISIT (OUTPATIENT)
Dept: INTERNAL MEDICINE | Facility: CLINIC | Age: 56
End: 2021-10-26

## 2021-10-26 VITALS
BODY MASS INDEX: 24.59 KG/M2 | OXYGEN SATURATION: 99 % | TEMPERATURE: 98 F | HEART RATE: 62 BPM | HEIGHT: 64 IN | SYSTOLIC BLOOD PRESSURE: 140 MMHG | WEIGHT: 144 LBS | DIASTOLIC BLOOD PRESSURE: 88 MMHG

## 2021-10-26 DIAGNOSIS — R53.82 CHRONIC FATIGUE: ICD-10-CM

## 2021-10-26 DIAGNOSIS — Z12.31 ENCOUNTER FOR SCREENING MAMMOGRAM FOR BREAST CANCER: ICD-10-CM

## 2021-10-26 DIAGNOSIS — Z78.0 MENOPAUSE: ICD-10-CM

## 2021-10-26 DIAGNOSIS — Z12.4 CERVICAL CANCER SCREENING: ICD-10-CM

## 2021-10-26 DIAGNOSIS — R73.09 ELEVATED GLUCOSE: ICD-10-CM

## 2021-10-26 DIAGNOSIS — Z00.00 ANNUAL PHYSICAL EXAM: Primary | ICD-10-CM

## 2021-10-26 DIAGNOSIS — I10 PRIMARY HYPERTENSION: ICD-10-CM

## 2021-10-26 DIAGNOSIS — N39.0 FREQUENT UTI: ICD-10-CM

## 2021-10-26 DIAGNOSIS — F41.9 ANXIETY: ICD-10-CM

## 2021-10-26 DIAGNOSIS — G47.00 INSOMNIA, UNSPECIFIED TYPE: ICD-10-CM

## 2021-10-26 DIAGNOSIS — I10 ESSENTIAL HYPERTENSION: ICD-10-CM

## 2021-10-26 DIAGNOSIS — M54.2 NECK PAIN: ICD-10-CM

## 2021-10-26 DIAGNOSIS — Z12.11 COLON CANCER SCREENING: ICD-10-CM

## 2021-10-26 DIAGNOSIS — E55.9 VITAMIN D DEFICIENCY: ICD-10-CM

## 2021-10-26 DIAGNOSIS — G89.4 CHRONIC PAIN SYNDROME: ICD-10-CM

## 2021-10-26 DIAGNOSIS — E78.49 OTHER HYPERLIPIDEMIA: ICD-10-CM

## 2021-10-26 PROCEDURE — 99396 PREV VISIT EST AGE 40-64: CPT | Performed by: INTERNAL MEDICINE

## 2021-10-26 PROCEDURE — 77067 SCR MAMMO BI INCL CAD: CPT | Performed by: RADIOLOGY

## 2021-10-26 PROCEDURE — 77067 SCR MAMMO BI INCL CAD: CPT | Performed by: INTERNAL MEDICINE

## 2021-10-26 PROCEDURE — 82274 ASSAY TEST FOR BLOOD FECAL: CPT | Performed by: INTERNAL MEDICINE

## 2021-10-26 RX ORDER — GABAPENTIN 800 MG/1
800 TABLET ORAL
Qty: 90 TABLET | Refills: 1 | Status: SHIPPED | OUTPATIENT
Start: 2021-10-26 | End: 2022-08-01

## 2021-10-26 RX ORDER — ESTRADIOL 0.1 MG/G
2 CREAM VAGINAL 2 TIMES WEEKLY
Qty: 1 EACH | Refills: 6 | Status: SHIPPED | OUTPATIENT
Start: 2021-10-28 | End: 2022-08-01

## 2021-10-26 RX ORDER — AMLODIPINE BESYLATE 5 MG/1
TABLET ORAL
Qty: 90 TABLET | Refills: 3 | Status: SHIPPED | OUTPATIENT
Start: 2021-10-26 | End: 2022-08-01

## 2021-10-26 RX ORDER — FLUOXETINE HYDROCHLORIDE 20 MG/1
CAPSULE ORAL
Qty: 180 CAPSULE | Refills: 2 | Status: SHIPPED | OUTPATIENT
Start: 2021-10-26 | End: 2022-08-01 | Stop reason: SDUPTHER

## 2021-10-26 RX ORDER — LORAZEPAM 1 MG/1
1 TABLET ORAL EVERY 8 HOURS PRN
Qty: 50 TABLET | Refills: 0 | Status: SHIPPED | OUTPATIENT
Start: 2021-10-26 | End: 2022-11-10 | Stop reason: SDUPTHER

## 2021-10-26 RX ORDER — LOSARTAN POTASSIUM AND HYDROCHLOROTHIAZIDE 12.5; 1 MG/1; MG/1
1 TABLET ORAL DAILY
Qty: 90 TABLET | Refills: 2 | Status: SHIPPED | OUTPATIENT
Start: 2021-10-26 | End: 2022-08-01 | Stop reason: SDUPTHER

## 2021-10-26 NOTE — PATIENT INSTRUCTIONS

## 2021-10-26 NOTE — PROGRESS NOTES
"Chief Complaint  Annual Exam (Physical)    Geena Morrison presents to [facility] for   History of Present Illness    Review of Systems   Constitutional: Negative for appetite change, chills, fatigue, fever and unexpected weight change.   HENT: Negative for congestion, ear pain, mouth sores, sinus pain, sore throat, tinnitus, trouble swallowing and voice change.    Eyes: Negative for pain and visual disturbance.   Respiratory: Negative for cough, choking, shortness of breath and wheezing.    Cardiovascular: Negative for chest pain, palpitations and leg swelling.   Gastrointestinal: Negative for abdominal pain, blood in stool, constipation, diarrhea, nausea and vomiting.   Endocrine: Negative for cold intolerance, heat intolerance, polydipsia and polyuria.   Genitourinary: Positive for enuresis. Negative for difficulty urinating, dysuria, flank pain, frequency, hematuria, urgency and vaginal bleeding.   Musculoskeletal: Positive for neck pain and neck stiffness. Negative for arthralgias, back pain, gait problem, joint swelling and myalgias.   Skin: Negative for color change and rash.   Allergic/Immunologic: Negative for environmental allergies, food allergies and immunocompromised state.   Neurological: Negative for dizziness, tremors, seizures, syncope, speech difficulty, weakness, numbness and headaches.   Hematological: Negative for adenopathy. Does not bruise/bleed easily.   Psychiatric/Behavioral: Positive for sleep disturbance. Negative for agitation, confusion, decreased concentration, dysphoric mood and suicidal ideas. The patient is nervous/anxious.         Objective   Vital Signs:   /88 (BP Location: Left arm, Patient Position: Sitting, Cuff Size: Adult)   Pulse 62   Temp 98 °F (36.7 °C)   Ht 163.2 cm (64.25\")   Wt 65.3 kg (144 lb)   SpO2 99%   BMI 24.53 kg/m²     Physical Exam  Vitals and nursing note reviewed.   Constitutional:       Appearance: " She is well-developed.   HENT:      Right Ear: Hearing, tympanic membrane, ear canal and external ear normal.      Left Ear: Hearing, tympanic membrane, ear canal and external ear normal.      Nose:      Right Sinus: No maxillary sinus tenderness or frontal sinus tenderness.      Left Sinus: No maxillary sinus tenderness or frontal sinus tenderness.   Eyes:      General: Lids are normal.      Conjunctiva/sclera: Conjunctivae normal.      Pupils: Pupils are equal, round, and reactive to light.   Neck:      Thyroid: No thyroid mass or thyromegaly.      Vascular: No carotid bruit or JVD.      Trachea: Trachea normal. No tracheal deviation.   Cardiovascular:      Rate and Rhythm: Normal rate and regular rhythm.      Pulses:           Carotid pulses are 2+ on the right side and 2+ on the left side.       Radial pulses are 2+ on the right side and 2+ on the left side.        Dorsalis pedis pulses are 2+ on the right side and 2+ on the left side.        Posterior tibial pulses are 2+ on the right side and 2+ on the left side.      Heart sounds: S1 normal and S2 normal. No murmur heard.  No friction rub. No gallop.    Pulmonary:      Effort: Pulmonary effort is normal.      Breath sounds: Normal breath sounds.   Chest:      Chest wall: There is no dullness to percussion.   Breasts:      Right: No inverted nipple, mass, nipple discharge, skin change, tenderness or supraclavicular adenopathy.      Left: No inverted nipple, mass, nipple discharge, skin change, tenderness or supraclavicular adenopathy.       Abdominal:      General: Bowel sounds are normal. There is no abdominal bruit.      Palpations: Abdomen is soft.      Tenderness: There is no abdominal tenderness. There is no guarding or rebound.      Hernia: No hernia is present.   Genitourinary:     Labia:         Right: No rash, tenderness, lesion or injury.         Left: No rash, tenderness, lesion or injury.       Vagina: Normal.      Cervix: Normal.      Uterus:  Normal.       Adnexa:         Right: No mass, tenderness or fullness.          Left: No mass, tenderness or fullness.        Rectum: Guaiac result negative. No mass or tenderness.   Musculoskeletal:         General: Normal range of motion.      Cervical back: Neck supple.   Lymphadenopathy:      Cervical: No cervical adenopathy.      Upper Body:      Right upper body: No supraclavicular adenopathy.      Left upper body: No supraclavicular adenopathy.   Skin:     General: Skin is warm and dry.   Neurological:      General: No focal deficit present.      Mental Status: She is alert.      Cranial Nerves: No cranial nerve deficit.      Sensory: No sensory deficit.      Deep Tendon Reflexes:      Reflex Scores:       Patellar reflexes are 2+ on the right side and 2+ on the left side.  Psychiatric:         Mood and Affect: Mood normal.         Behavior: Behavior normal.         Thought Content: Thought content normal.         Judgment: Judgment normal.        Result Review :                 Assessment and Plan    Problem List Items Addressed This Visit        Unprioritized    Hypertension    Current Assessment & Plan     Hypertension is improving with treatment.  Continue current treatment regimen.  Dietary sodium restriction.  Regular aerobic exercise.  Continue current medications.  Blood pressure will be reassessed at the next regular appointment.         Relevant Medications    losartan-hydrochlorothiazide (HYZAAR) 100-12.5 MG per tablet    amLODIPine (NORVASC) 5 MG tablet    Insomnia    Current Assessment & Plan     Takes gabapentin -call if worse.         Relevant Medications    gabapentin (NEURONTIN) 800 MG tablet    Anxiety    Current Assessment & Plan     Controlled - rarely needing ativan (& never takes with hydrocodone)         Relevant Medications    LORazepam (ATIVAN) 1 MG tablet    FLUoxetine (PROzac) 20 MG capsule    Elevated glucose    Current Assessment & Plan     Continue low sugar diet         Neck pain     Overview     Epidural injections helped in 2011.         Current Assessment & Plan     Worse now - need appt again with neurosurgeon - need repeat epidural injections         Relevant Orders    Ambulatory Referral to Neurosurgery (Completed)    Chronic pain    Overview     Neck pain         Relevant Orders    Ambulatory Referral to Neurosurgery (Completed)    Chronic fatigue    Relevant Orders    TSH Rfx On Abnormal To Free T4    Vitamin D deficiency    Overview     D=25         Current Assessment & Plan     Need vit d 2,000-3,000 units/d         Other hyperlipidemia    Current Assessment & Plan     Lipid abnormalities are improving with treatment.  Nutritional counseling was provided.  Lipids will be reassessed in 3 months.         Frequent UTI    Relevant Medications    estradiol (ESTRACE VAGINAL) 0.1 MG/GM vaginal cream (Start on 10/28/2021)      Other Visit Diagnoses     Annual physical exam    -  Primary    Relevant Orders    CBC & Differential    Comprehensive Metabolic Panel    Lipid Panel    Urinalysis With Microscopic If Indicated (No Culture) - Urine, Clean Catch    Menopause        Relevant Orders    DEXA Bone Density Axial    Essential hypertension        need daily chk & call if blood pressures over 130/80.    Relevant Medications    losartan-hydrochlorothiazide (HYZAAR) 100-12.5 MG per tablet    amLODIPine (NORVASC) 5 MG tablet          Follow Up   Return in about 6 months (around 4/26/2022) for Recheck.  Patient was given instructions and counseling regarding her condition or for health maintenance advice. Please see specific information pulled into the AVS if appropriate.      Discussed need to stay up to date on screening exams as indicated on sex, age & risk factors. I encouraged healthy weight maintenance with diet & exercise. Need good sleep habits & continue to avoid tobacco & excessive alcohol.     Insurance did not pay for epidural injections - discuss with neurosurgeon. Need to get  hydrocodone from neurosurgeon if still needed.

## 2021-10-27 LAB
ALBUMIN SERPL-MCNC: 4.6 G/DL (ref 3.8–4.9)
ALBUMIN/GLOB SERPL: 1.8 {RATIO} (ref 1.2–2.2)
ALP SERPL-CCNC: 60 IU/L (ref 44–121)
ALT SERPL-CCNC: 26 IU/L (ref 0–32)
APPEARANCE UR: CLEAR
AST SERPL-CCNC: 22 IU/L (ref 0–40)
BACTERIA #/AREA URNS HPF: ABNORMAL /[HPF]
BASOPHILS # BLD AUTO: 0 X10E3/UL (ref 0–0.2)
BASOPHILS NFR BLD AUTO: 1 %
BILIRUB SERPL-MCNC: 0.5 MG/DL (ref 0–1.2)
BILIRUB UR QL STRIP: NEGATIVE
BUN SERPL-MCNC: 13 MG/DL (ref 6–24)
BUN/CREAT SERPL: 18 (ref 9–23)
CALCIUM SERPL-MCNC: 9.3 MG/DL (ref 8.7–10.2)
CASTS URNS QL MICRO: ABNORMAL /LPF
CHLORIDE SERPL-SCNC: 99 MMOL/L (ref 96–106)
CHOLEST SERPL-MCNC: 264 MG/DL (ref 100–199)
CO2 SERPL-SCNC: 26 MMOL/L (ref 20–29)
COLOR UR: YELLOW
CREAT SERPL-MCNC: 0.72 MG/DL (ref 0.57–1)
EOSINOPHIL # BLD AUTO: 0.3 X10E3/UL (ref 0–0.4)
EOSINOPHIL NFR BLD AUTO: 5 %
EPI CELLS #/AREA URNS HPF: ABNORMAL /HPF (ref 0–10)
ERYTHROCYTE [DISTWIDTH] IN BLOOD BY AUTOMATED COUNT: 11.8 % (ref 11.7–15.4)
GLOBULIN SER CALC-MCNC: 2.6 G/DL (ref 1.5–4.5)
GLUCOSE SERPL-MCNC: 94 MG/DL (ref 65–99)
GLUCOSE UR QL: NEGATIVE
HCT VFR BLD AUTO: 40.4 % (ref 34–46.6)
HDLC SERPL-MCNC: 80 MG/DL
HEMOCCULT STL QL IA: NEGATIVE
HGB BLD-MCNC: 13.3 G/DL (ref 11.1–15.9)
HGB UR QL STRIP: NEGATIVE
IMM GRANULOCYTES # BLD AUTO: 0 X10E3/UL (ref 0–0.1)
IMM GRANULOCYTES NFR BLD AUTO: 0 %
KETONES UR QL STRIP: ABNORMAL
LDLC SERPL CALC-MCNC: 171 MG/DL (ref 0–99)
LEUKOCYTE ESTERASE UR QL STRIP: NEGATIVE
LYMPHOCYTES # BLD AUTO: 1.5 X10E3/UL (ref 0.7–3.1)
LYMPHOCYTES NFR BLD AUTO: 26 %
Lab: NORMAL
MCH RBC QN AUTO: 30.5 PG (ref 26.6–33)
MCHC RBC AUTO-ENTMCNC: 32.9 G/DL (ref 31.5–35.7)
MCV RBC AUTO: 93 FL (ref 79–97)
MICRO URNS: ABNORMAL
MONOCYTES # BLD AUTO: 0.3 X10E3/UL (ref 0.1–0.9)
MONOCYTES NFR BLD AUTO: 5 %
NEUTROPHILS # BLD AUTO: 3.5 X10E3/UL (ref 1.4–7)
NEUTROPHILS NFR BLD AUTO: 63 %
NITRITE UR QL STRIP: POSITIVE
PH UR STRIP: 7 [PH] (ref 5–7.5)
PLATELET # BLD AUTO: 266 X10E3/UL (ref 150–450)
POTASSIUM SERPL-SCNC: 5 MMOL/L (ref 3.5–5.2)
PROT SERPL-MCNC: 7.2 G/DL (ref 6–8.5)
PROT UR QL STRIP: NEGATIVE
RBC # BLD AUTO: 4.36 X10E6/UL (ref 3.77–5.28)
RBC #/AREA URNS HPF: ABNORMAL /HPF (ref 0–2)
SODIUM SERPL-SCNC: 141 MMOL/L (ref 134–144)
SP GR UR: 1.02 (ref 1–1.03)
TRIGL SERPL-MCNC: 79 MG/DL (ref 0–149)
TSH SERPL DL<=0.005 MIU/L-ACNC: 1.79 UIU/ML (ref 0.45–4.5)
UROBILINOGEN UR STRIP-MCNC: 1 MG/DL (ref 0.2–1)
VLDLC SERPL CALC-MCNC: 13 MG/DL (ref 5–40)
WBC # BLD AUTO: 5.5 X10E3/UL (ref 3.4–10.8)
WBC #/AREA URNS HPF: ABNORMAL /HPF (ref 0–5)

## 2021-11-02 ENCOUNTER — CLINICAL SUPPORT (OUTPATIENT)
Dept: INTERNAL MEDICINE | Facility: CLINIC | Age: 56
End: 2021-11-02

## 2021-11-02 DIAGNOSIS — Z78.0 MENOPAUSE: ICD-10-CM

## 2021-11-02 LAB
CYTOLOGIST CVX/VAG CYTO: NORMAL
CYTOLOGY CVX/VAG DOC CYTO: NORMAL
CYTOLOGY CVX/VAG DOC THIN PREP: NORMAL
DX ICD CODE: NORMAL
HIV 1 & 2 AB SER-IMP: NORMAL
HPV I/H RISK 4 DNA CVX QL PROBE+SIG AMP: NEGATIVE
OTHER STN SPEC: NORMAL
REF LAB TEST METHOD: NORMAL
STAT OF ADQ CVX/VAG CYTO-IMP: NORMAL

## 2021-11-02 PROCEDURE — 77080 DXA BONE DENSITY AXIAL: CPT | Performed by: INTERNAL MEDICINE

## 2021-11-04 RX ORDER — CEPHALEXIN 500 MG/1
500 CAPSULE ORAL 2 TIMES DAILY
Qty: 20 CAPSULE | Refills: 0 | Status: SHIPPED | OUTPATIENT
Start: 2021-11-04 | End: 2022-08-01

## 2021-12-16 ENCOUNTER — TELEPHONE (OUTPATIENT)
Dept: INTERNAL MEDICINE | Facility: CLINIC | Age: 56
End: 2021-12-16

## 2021-12-16 DIAGNOSIS — M54.2 NECK PAIN: ICD-10-CM

## 2021-12-16 DIAGNOSIS — R52 PAIN: ICD-10-CM

## 2021-12-16 RX ORDER — HYDROCODONE BITARTRATE AND ACETAMINOPHEN 10; 325 MG/1; MG/1
1 TABLET ORAL EVERY 8 HOURS PRN
Qty: 15 TABLET | Refills: 0 | Status: SHIPPED | OUTPATIENT
Start: 2021-12-16 | End: 2022-08-01 | Stop reason: SDUPTHER

## 2021-12-16 NOTE — TELEPHONE ENCOUNTER
----- Message from Adela Morrison sent at 12/16/2021  3:03 PM EST -----  Regarding: Need RX   Dr. Huston,  per our last in-person visit you said for me to message you about sending my Hydrocodone prescription the first week of December.   Please send it to the [facility] in [place] (on file).  Thank you for being an amazing Physician and I hope you enjoy every minute of your residential; maybe I'll see ya in the airport LOLMike Chapman

## 2022-01-18 ENCOUNTER — TELEPHONE (OUTPATIENT)
Dept: NEUROSURGERY | Facility: CLINIC | Age: 57
End: 2022-01-18

## 2022-01-18 NOTE — TELEPHONE ENCOUNTER
FYI    PATIENT CALLED TO CANCEL HER NEW PATIENT APPT FOR 1/20/22 W DR. SANTACRUZ. SHE DID NOT WISH TO RESCHEDULE AT THIS TIME. APPT HAS BEEN CANCELLED.

## 2022-08-01 ENCOUNTER — OFFICE VISIT (OUTPATIENT)
Dept: INTERNAL MEDICINE | Facility: CLINIC | Age: 57
End: 2022-08-01

## 2022-08-01 VITALS
BODY MASS INDEX: 25.61 KG/M2 | OXYGEN SATURATION: 98 % | HEIGHT: 64 IN | HEART RATE: 63 BPM | TEMPERATURE: 98.3 F | DIASTOLIC BLOOD PRESSURE: 90 MMHG | WEIGHT: 150 LBS | SYSTOLIC BLOOD PRESSURE: 134 MMHG

## 2022-08-01 DIAGNOSIS — I10 PRIMARY HYPERTENSION: ICD-10-CM

## 2022-08-01 DIAGNOSIS — R52 PAIN: ICD-10-CM

## 2022-08-01 DIAGNOSIS — I10 ESSENTIAL HYPERTENSION: ICD-10-CM

## 2022-08-01 DIAGNOSIS — F41.9 ANXIETY: Primary | ICD-10-CM

## 2022-08-01 DIAGNOSIS — N39.0 FREQUENT UTI: ICD-10-CM

## 2022-08-01 DIAGNOSIS — M21.619 BUNION OF GREAT TOE: ICD-10-CM

## 2022-08-01 DIAGNOSIS — M54.2 NECK PAIN: ICD-10-CM

## 2022-08-01 DIAGNOSIS — E78.49 OTHER HYPERLIPIDEMIA: ICD-10-CM

## 2022-08-01 PROCEDURE — 99214 OFFICE O/P EST MOD 30 MIN: CPT | Performed by: FAMILY MEDICINE

## 2022-08-01 RX ORDER — HYDROCODONE BITARTRATE AND ACETAMINOPHEN 10; 325 MG/1; MG/1
1 TABLET ORAL EVERY 8 HOURS PRN
Qty: 15 TABLET | Refills: 0 | Status: SHIPPED | OUTPATIENT
Start: 2022-08-01 | End: 2023-03-31 | Stop reason: SDUPTHER

## 2022-08-01 RX ORDER — FLUOXETINE HYDROCHLORIDE 20 MG/1
CAPSULE ORAL
Qty: 180 CAPSULE | Refills: 2 | Status: SHIPPED | OUTPATIENT
Start: 2022-08-01 | End: 2023-03-06

## 2022-08-01 RX ORDER — LOSARTAN POTASSIUM AND HYDROCHLOROTHIAZIDE 12.5; 1 MG/1; MG/1
1 TABLET ORAL DAILY
Qty: 90 TABLET | Refills: 2 | Status: SHIPPED | OUTPATIENT
Start: 2022-08-01

## 2022-08-01 NOTE — ASSESSMENT & PLAN NOTE
She states she worries a lot. She is taking fluoxetine 2 tab daily.   Taking lorazepam as needed, which is not very often.

## 2022-08-01 NOTE — PROGRESS NOTES
"    Chief Complaint  Establish Care, Hypertension, Hyperlipidemia, and Insomnia (Issues sleeping)    Subjective    History of Present Illness {CC  Problem List  Visit  Diagnosis   Encounters  Notes  Medications  Labs  Result Review Imaging  Media :23}     Adela Morrison presents to [facility] for   History of Present Illness     57 yo female present to establish care. She is new to me, she was previously seeing Dr. Huston.   She has a history of hypertension, hyperlipidemia, vitamin D df, insomnia, and anxiety.    Anxiety stable at this time. Taking fluoxetine as prescribed with occassional use of lorazepam.  Taking lorazepam if issues with work..     R bunashu    Social History     Socioeconomic History   • Marital status: Single   Tobacco Use   • Smoking status: Never Smoker   • Smokeless tobacco: Never Used   Substance and Sexual Activity   • Alcohol use: Yes     Comment: SOCIALLY   • Drug use: Yes     Types: Hydrocodone, Benzodiazepines, Other     Comment: Prescribed Gabapentin/Lorazepam   • Sexual activity: Yes     Partners: Male     Birth control/protection: None      Objective     Vital Signs:   /90   Pulse 63   Temp 98.3 °F (36.8 °C)   Ht 163.2 cm (64.25\")   Wt 68 kg (150 lb)   SpO2 98%   BMI 25.55 kg/m²      Physical Exam  Constitutional:       General: She is not in acute distress.     Appearance: She is not ill-appearing.   HENT:      Head: Normocephalic.      Mouth/Throat:      Pharynx: No oropharyngeal exudate.   Eyes:      Conjunctiva/sclera: Conjunctivae normal.   Cardiovascular:      Rate and Rhythm: Regular rhythm.      Heart sounds: Normal heart sounds.   Pulmonary:      Effort: No respiratory distress.      Breath sounds: Normal breath sounds. No wheezing.   Musculoskeletal:      Right lower leg: No edema.      Left lower leg: No edema.   Neurological:      Mental Status: She is alert.   Psychiatric:         Mood and Affect: Mood normal.      "   Result Review  Data Reviewed:{ Labs  Result Review  Imaging  Med Tab  Media :23}   The following data was reviewed by: Tyesha Alarcon MD on 08/01/2022  Lab Results - Last 18 Months   Lab Units 10/26/21  0000   BUN mg/dL 13   CREATININE mg/dL 0.72   EGFR IF NONAFRICN AM mL/min/1.73 94   EGFR IF AFRICN AM mL/min/1.73 108   SODIUM mmol/L 141   POTASSIUM mmol/L 5.0   CHLORIDE mmol/L 99   CALCIUM mg/dL 9.3   ALBUMIN g/dL 4.6   BILIRUBIN mg/dL 0.5   ALK PHOS IU/L 60   AST (SGOT) IU/L 22   ALT (SGPT) IU/L 26   CHOLESTEROL mg/dL 264*   TRIGLYCERIDES mg/dL 79   HDL CHOL mg/dL 80   VLDL CHOLESTEROL CODY mg/dL 13   LDL CHOL mg/dL 171*   WBC x10E3/uL 5.5   RBC x10E6/uL 4.36   HEMATOCRIT % 40.4   MCV fL 93   MCH pg 30.5   TSH uIU/mL 1.790              Current Outpatient Medications:   •  estrogens, conjugated, (Premarin) 0.625 MG tablet, Take 1 tablet by mouth Daily. Take daily for 21 days then do not take for 7 days., Disp: 90 tablet, Rfl: 1  •  FLUoxetine (PROzac) 20 MG capsule, 2 tab po daily, Disp: 180 capsule, Rfl: 2  •  HYDROcodone-acetaminophen (NORCO)  MG per tablet, Take 1 tablet by mouth Every 8 (Eight) Hours As Needed for Moderate Pain . Need to use sparingly- this med is addictive!, Disp: 15 tablet, Rfl: 0  •  LORazepam (ATIVAN) 1 MG tablet, Take 1 tablet by mouth Every 8 (Eight) Hours As Needed for Anxiety., Disp: 50 tablet, Rfl: 0  •  losartan-hydrochlorothiazide (HYZAAR) 100-12.5 MG per tablet, Take 1 tablet by mouth Daily., Disp: 90 tablet, Rfl: 2      Assessment and Plan {CC Problem List  Visit Diagnosis  ROS  Review (Popup)  Health Maintenance  Quality  BestPractice  Medications  SmartSets  SnapShot Encounters  Media :23}   Problem List Items Addressed This Visit        Cardiac and Vasculature    Hypertension    Current Assessment & Plan     Hypertension is chronic, stable.  Continue current treatment regimen.  Dietary sodium restriction.  Regular aerobic exercise.  Continue current  medications.  Ambulatory blood pressure monitoring.  Blood pressure will be reassessed in 3 months.         Relevant Medications    losartan-hydrochlorothiazide (HYZAAR) 100-12.5 MG per tablet    Other Relevant Orders    Comprehensive metabolic panel    Other hyperlipidemia    Current Assessment & Plan     Lipid abnormalities are chronic.  check lipid panel, discuss mediation based up on results. High fiber diet recommended avoid red meat and fried food  Lipids will be reassessed in 3 months.         Relevant Orders    Lipid panel    Comprehensive metabolic panel       Genitourinary and Reproductive     Frequent UTI       Mental Health    Anxiety - Primary    Current Assessment & Plan     She states she worries a lot. She is taking fluoxetine 2 tab daily.   Taking lorazepam as needed, which is not very often.          Relevant Medications    FLUoxetine (PROzac) 20 MG capsule       Musculoskeletal and Injuries    Neck pain    Overview     Epidural injections helped in 2011.         Current Assessment & Plan     Stop taking gabapentin, did not help, stopped about 6 months ago  Using Norco as needed for neck, maybe 6 pills.  Has had epidural injections in the past which helped previoiusly.          Relevant Medications    HYDROcodone-acetaminophen (NORCO)  MG per tablet       Symptoms and Signs    Pain    Overview     need to use hydrocodone only for signif pain         Relevant Medications    HYDROcodone-acetaminophen (NORCO)  MG per tablet      Other Visit Diagnoses     Bunion of great toe        Relevant Orders    Ambulatory Referral to Podiatry    Essential hypertension        need daily chk & call if blood pressures over 130/80.    Relevant Medications    losartan-hydrochlorothiazide (HYZAAR) 100-12.5 MG per tablet          Follow Up   Return in about 3 months (around 11/1/2022).  Patient was given instructions and counseling regarding her condition or for health maintenance advice. Please see specific  information pulled into the AVS if appropriate.    EpicAct:[email or web address]_ORDERS,RunParams:STARTUPTYPE=FOLLOW    [email or web address]_DISCHARGE

## 2022-08-01 NOTE — ASSESSMENT & PLAN NOTE
Lipid abnormalities are chronic.  check lipid panel, discuss mediation based up on results. High fiber diet recommended avoid red meat and fried food  Lipids will be reassessed in 3 months.

## 2022-08-01 NOTE — ASSESSMENT & PLAN NOTE
Hypertension is chronic, stable.  Continue current treatment regimen.  Dietary sodium restriction.  Regular aerobic exercise.  Continue current medications.  Ambulatory blood pressure monitoring.  Blood pressure will be reassessed in 3 months.

## 2022-08-01 NOTE — ASSESSMENT & PLAN NOTE
Stop taking gabapentin, did not help, stopped about 6 months ago  Using Norco as needed for neck, maybe 6 pills.  Has had epidural injections in the past which helped previoiusly.

## 2022-08-02 LAB
ALBUMIN SERPL-MCNC: 4.4 G/DL (ref 3.8–4.9)
ALBUMIN/GLOB SERPL: 1.6 {RATIO} (ref 1.2–2.2)
ALP SERPL-CCNC: 65 IU/L (ref 44–121)
ALT SERPL-CCNC: 14 IU/L (ref 0–32)
AST SERPL-CCNC: 18 IU/L (ref 0–40)
BILIRUB SERPL-MCNC: 0.6 MG/DL (ref 0–1.2)
BUN SERPL-MCNC: 11 MG/DL (ref 6–24)
BUN/CREAT SERPL: 14 (ref 9–23)
CALCIUM SERPL-MCNC: 9.2 MG/DL (ref 8.7–10.2)
CHLORIDE SERPL-SCNC: 96 MMOL/L (ref 96–106)
CHOLEST SERPL-MCNC: 241 MG/DL (ref 100–199)
CO2 SERPL-SCNC: 27 MMOL/L (ref 20–29)
CREAT SERPL-MCNC: 0.8 MG/DL (ref 0.57–1)
EGFRCR SERPLBLD CKD-EPI 2021: 86 ML/MIN/1.73
GLOBULIN SER CALC-MCNC: 2.8 G/DL (ref 1.5–4.5)
GLUCOSE SERPL-MCNC: 103 MG/DL (ref 65–99)
HDLC SERPL-MCNC: 75 MG/DL
LDLC SERPL CALC-MCNC: 146 MG/DL (ref 0–99)
POTASSIUM SERPL-SCNC: 4.4 MMOL/L (ref 3.5–5.2)
PROT SERPL-MCNC: 7.2 G/DL (ref 6–8.5)
SODIUM SERPL-SCNC: 134 MMOL/L (ref 134–144)
TRIGL SERPL-MCNC: 113 MG/DL (ref 0–149)
VLDLC SERPL CALC-MCNC: 20 MG/DL (ref 5–40)

## 2022-11-08 ENCOUNTER — OFFICE VISIT (OUTPATIENT)
Dept: INTERNAL MEDICINE | Facility: CLINIC | Age: 57
End: 2022-11-08

## 2022-11-08 VITALS
SYSTOLIC BLOOD PRESSURE: 116 MMHG | BODY MASS INDEX: 25.95 KG/M2 | HEART RATE: 62 BPM | HEIGHT: 64 IN | WEIGHT: 152 LBS | OXYGEN SATURATION: 98 % | TEMPERATURE: 98.6 F | DIASTOLIC BLOOD PRESSURE: 62 MMHG

## 2022-11-08 DIAGNOSIS — I10 PRIMARY HYPERTENSION: Primary | ICD-10-CM

## 2022-11-08 DIAGNOSIS — F41.9 ANXIETY: ICD-10-CM

## 2022-11-08 DIAGNOSIS — E78.49 OTHER HYPERLIPIDEMIA: ICD-10-CM

## 2022-11-08 DIAGNOSIS — G89.4 CHRONIC PAIN SYNDROME: ICD-10-CM

## 2022-11-08 DIAGNOSIS — M54.2 NECK PAIN: ICD-10-CM

## 2022-11-08 DIAGNOSIS — Z12.31 ENCOUNTER FOR SCREENING MAMMOGRAM FOR MALIGNANT NEOPLASM OF BREAST: ICD-10-CM

## 2022-11-08 DIAGNOSIS — F11.10 OPIATE ABUSE, EPISODIC: ICD-10-CM

## 2022-11-08 DIAGNOSIS — M21.619 BUNION OF GREAT TOE: ICD-10-CM

## 2022-11-08 LAB
ALBUMIN SERPL-MCNC: 4.2 G/DL (ref 3.5–5.2)
ALBUMIN/GLOB SERPL: 1.6 G/DL
ALP SERPL-CCNC: 63 U/L (ref 39–117)
ALT SERPL-CCNC: 17 U/L (ref 1–33)
AST SERPL-CCNC: 18 U/L (ref 1–32)
BILIRUB SERPL-MCNC: 0.5 MG/DL (ref 0–1.2)
BUN SERPL-MCNC: 14 MG/DL (ref 6–20)
BUN/CREAT SERPL: 18.2 (ref 7–25)
CALCIUM SERPL-MCNC: 9.1 MG/DL (ref 8.6–10.5)
CHLORIDE SERPL-SCNC: 99 MMOL/L (ref 98–107)
CHOLEST SERPL-MCNC: 224 MG/DL (ref 0–200)
CO2 SERPL-SCNC: 27.2 MMOL/L (ref 22–29)
CREAT SERPL-MCNC: 0.77 MG/DL (ref 0.57–1)
EGFRCR SERPLBLD CKD-EPI 2021: 90.1 ML/MIN/1.73
GLOBULIN SER CALC-MCNC: 2.7 GM/DL
GLUCOSE SERPL-MCNC: 98 MG/DL (ref 65–99)
HDLC SERPL-MCNC: 71 MG/DL (ref 40–60)
LDLC SERPL CALC-MCNC: 139 MG/DL (ref 0–100)
POTASSIUM SERPL-SCNC: 4.7 MMOL/L (ref 3.5–5.2)
PROT SERPL-MCNC: 6.9 G/DL (ref 6–8.5)
SODIUM SERPL-SCNC: 136 MMOL/L (ref 136–145)
TRIGL SERPL-MCNC: 83 MG/DL (ref 0–150)
VLDLC SERPL CALC-MCNC: 14 MG/DL (ref 5–40)

## 2022-11-08 PROCEDURE — 99214 OFFICE O/P EST MOD 30 MIN: CPT | Performed by: FAMILY MEDICINE

## 2022-11-08 NOTE — ASSESSMENT & PLAN NOTE
Chronic  Improving.  Taking fluoxetine as prescribe with occassional use of lorazepam given from Dr. Huston.   Aditi sawyer

## 2022-11-08 NOTE — ASSESSMENT & PLAN NOTE
Lipid abnormalities are chronic, improving.  continue monitor lipid panel    Continue low fat, high fiber diet  Continue to avoid alcohol.   Repeat lipid panel.   Lipids will be reassessed in 6 months.

## 2022-11-08 NOTE — ASSESSMENT & PLAN NOTE
Continue monitor   States taking norco as needed, last script was last year.   silverio reviewed  uds  Substance agreement form

## 2022-11-08 NOTE — PROGRESS NOTES
"    Chief Complaint  Hyperlipidemia and Hypertension    Subjective    History of Present Illness {CC  Problem List  Visit  Diagnosis   Encounters  Notes  Medications  Labs  Result Review Imaging  Media :23}     Adela Morrison presents to [facility] for   History of Present Illness     55 yo female present for follow up visit.    She states she has been trying to lay off the alcohol. States trying to monitor diet.     Exercising more by joining a [employer].     She still has issues with chornic neck pain. Taking norco as need when she has flares.      Ativan given by Dr. Huston to use as needed for anxiety. She does not use frequently last fill last year.  Taking as needed. Taking fluoxetine daily has help.     Social History     Socioeconomic History   • Marital status: Single   Tobacco Use   • Smoking status: Never   • Smokeless tobacco: Never   Vaping Use   • Vaping Use: Never used   Substance and Sexual Activity   • Alcohol use: Yes     Comment: SOCIALLY   • Drug use: Yes     Types: Hydrocodone, Benzodiazepines, Other     Comment: Prescribed Gabapentin/Lorazepam   • Sexual activity: Yes     Partners: Male     Birth control/protection: None      Objective     Vital Signs:   /62   Pulse 62   Temp 98.6 °F (37 °C)   Ht 163.2 cm (64.25\")   Wt 68.9 kg (152 lb)   SpO2 98%   BMI 25.89 kg/m²   Physical Exam  Constitutional:       General: She is not in acute distress.  HENT:      Head: Normocephalic.   Cardiovascular:      Rate and Rhythm: Regular rhythm.      Heart sounds: Normal heart sounds.   Pulmonary:      Effort: No respiratory distress.      Breath sounds: Normal breath sounds. No wheezing.   Musculoskeletal:      Right lower leg: No edema.      Left lower leg: No edema.   Neurological:      Mental Status: She is alert.   Psychiatric:         Mood and Affect: Mood normal.        Result Review  Data Reviewed:{ Labs  Result Review  Imaging  Med Tab  Media " :23}   The following data was reviewed by: Tyesha Alarcon MD on 11/08/2022  Lab Results - Last 18 Months   Lab Units 08/01/22  1049 10/26/21  0000   BUN mg/dL 11 13   CREATININE mg/dL 0.80 0.72   EGFR IF NONAFRICN AM mL/min/1.73  --  94   EGFR IF AFRICN AM mL/min/1.73  --  108   SODIUM mmol/L 134 141   POTASSIUM mmol/L 4.4 5.0   CHLORIDE mmol/L 96 99   CALCIUM mg/dL 9.2 9.3   ALBUMIN g/dL 4.4 4.6   BILIRUBIN mg/dL 0.6 0.5   ALK PHOS IU/L 65 60   AST (SGOT) IU/L 18 22   ALT (SGPT) IU/L 14 26   CHOLESTEROL mg/dL 241* 264*   TRIGLYCERIDES mg/dL 113 79   HDL CHOL mg/dL 75 80   VLDL CHOLESTEROL CODY mg/dL 20 13   LDL CHOL mg/dL 146* 171*   WBC x10E3/uL  --  5.5   RBC x10E6/uL  --  4.36   HEMATOCRIT %  --  40.4   MCV fL  --  93   MCH pg  --  30.5   TSH uIU/mL  --  1.790              Current Outpatient Medications:   •  estrogens, conjugated, (Premarin) 0.625 MG tablet, Take 1 tablet by mouth Daily. Take daily for 21 days then do not take for 7 days., Disp: 90 tablet, Rfl: 1  •  FLUoxetine (PROzac) 20 MG capsule, 2 tab po daily, Disp: 180 capsule, Rfl: 2  •  HYDROcodone-acetaminophen (NORCO)  MG per tablet, Take 1 tablet by mouth Every 8 (Eight) Hours As Needed for Moderate Pain . Need to use sparingly- this med is addictive!, Disp: 15 tablet, Rfl: 0  •  LORazepam (ATIVAN) 1 MG tablet, Take 1 tablet by mouth Every 8 (Eight) Hours As Needed for Anxiety., Disp: 50 tablet, Rfl: 0  •  losartan-hydrochlorothiazide (HYZAAR) 100-12.5 MG per tablet, Take 1 tablet by mouth Daily., Disp: 90 tablet, Rfl: 2      Assessment and Plan {CC Problem List  Visit Diagnosis  ROS  Review (Popup)  Health Maintenance  Quality  BestPractice  Medications  SmartSets  SnapShot Encounters  Media :23}   Problem List Items Addressed This Visit        Cardiac and Vasculature    Hypertension - Primary    Current Assessment & Plan     Hypertension is chronic, stab.e on medication .  Continue current treatment regimen.  Dietary sodium  restriction.  Regular aerobic exercise.  Continue current medications.  Ambulatory blood pressure monitoring.  Blood pressure will be reassessed at the next regular appointment.         Relevant Orders    Comprehensive metabolic panel    Other hyperlipidemia    Current Assessment & Plan     Lipid abnormalities are chronic, improving.  continue monitor lipid panel    Continue low fat, high fiber diet  Continue to avoid alcohol.   Repeat lipid panel.   Lipids will be reassessed in 6 months.         Relevant Orders    Lipid panel       Mental Health    Anxiety    Current Assessment & Plan     Chronic  Improving.  Taking fluoxetine as prescribe with occassional use of lorazepam given from Dr. Huston.   sMike sawyer              Musculoskeletal and Injuries    Neck pain    Overview     Epidural injections helped in 2011.         Current Assessment & Plan     Continue monitor   States taking norco as needed, last script was last year.   silverio reviewed  uds  Substance agreement form         Relevant Orders    Urine Drug Screen - Urine, Clean Catch       Neuro    Chronic pain    Overview     Neck pain         Relevant Orders    Urine Drug Screen - Urine, Clean Catch   Other Visit Diagnoses     Bunion of great toe        Relevant Orders    Ambulatory Referral to Podiatry    Encounter for screening mammogram for malignant neoplasm of breast        Relevant Orders    Mammo Screening Bilateral With CAD    Opiate abuse, episodic (HCC)        Relevant Orders    Urine Drug Screen - Urine, Clean Catch          Follow Up   Return in about 6 months (around 5/8/2023) for Annual physical.  Patient was given instructions and counseling regarding her condition or for health maintenance advice. Please see specific information pulled into the AVS if appropriate.    EpicAct:[email or web address]_AMB_ORDERS,RunParams:STARTUPTYPE=FOLLOW    [email or web address]_DISCHARGE

## 2022-11-08 NOTE — ASSESSMENT & PLAN NOTE
Hypertension is chronic, stab.e on medication .  Continue current treatment regimen.  Dietary sodium restriction.  Regular aerobic exercise.  Continue current medications.  Ambulatory blood pressure monitoring.  Blood pressure will be reassessed at the next regular appointment.

## 2022-11-09 LAB
AMPHETAMINES UR QL SCN: NEGATIVE NG/ML
BARBITURATES UR QL SCN: NEGATIVE NG/ML
BENZODIAZ UR QL SCN: NEGATIVE NG/ML
BZE UR QL SCN: NEGATIVE NG/ML
CANNABINOIDS UR QL SCN: NEGATIVE NG/ML
CREAT UR-MCNC: 181.6 MG/DL (ref 20–300)
LABORATORY COMMENT REPORT: ABNORMAL
METHADONE UR QL SCN: NEGATIVE NG/ML
OPIATES UR QL SCN: POSITIVE NG/ML
OXYCODONE+OXYMORPHONE UR QL SCN: NEGATIVE NG/ML
PCP UR QL: NEGATIVE NG/ML
PH UR: 6.8 [PH] (ref 4.5–8.9)
PROPOXYPH UR QL SCN: NEGATIVE NG/ML

## 2022-11-10 DIAGNOSIS — M54.2 NECK PAIN: ICD-10-CM

## 2022-11-10 DIAGNOSIS — R52 PAIN: ICD-10-CM

## 2022-11-10 DIAGNOSIS — F41.9 ANXIETY: ICD-10-CM

## 2022-11-10 RX ORDER — HYDROCODONE BITARTRATE AND ACETAMINOPHEN 5; 325 MG/1; MG/1
1 TABLET ORAL EVERY 6 HOURS PRN
Qty: 15 TABLET | Refills: 0 | Status: SHIPPED | OUTPATIENT
Start: 2022-11-10 | End: 2023-03-06

## 2022-11-10 RX ORDER — LORAZEPAM 1 MG/1
1 TABLET ORAL EVERY 8 HOURS PRN
Qty: 30 TABLET | Refills: 0 | Status: SHIPPED | OUTPATIENT
Start: 2022-11-10 | End: 2022-11-10

## 2022-11-10 RX ORDER — LORAZEPAM 1 MG/1
1 TABLET ORAL EVERY 8 HOURS PRN
Qty: 30 TABLET | Refills: 0 | Status: SHIPPED | OUTPATIENT
Start: 2022-11-10 | End: 2023-03-13

## 2022-11-11 ENCOUNTER — TRANSCRIBE ORDERS (OUTPATIENT)
Dept: ADMINISTRATIVE | Facility: HOSPITAL | Age: 57
End: 2022-11-11

## 2022-11-11 DIAGNOSIS — Z12.31 SCREENING MAMMOGRAM FOR HIGH-RISK PATIENT: Primary | ICD-10-CM

## 2022-12-05 ENCOUNTER — HOSPITAL ENCOUNTER (OUTPATIENT)
Dept: MAMMOGRAPHY | Facility: HOSPITAL | Age: 57
Discharge: HOME | End: 2022-12-05
Admitting: FAMILY MEDICINE

## 2022-12-05 DIAGNOSIS — Z12.31 SCREENING MAMMOGRAM FOR HIGH-RISK PATIENT: ICD-10-CM

## 2022-12-05 PROCEDURE — 77063 BREAST TOMOSYNTHESIS BI: CPT

## 2022-12-05 PROCEDURE — 77067 SCR MAMMO BI INCL CAD: CPT

## 2022-12-12 DIAGNOSIS — F41.9 ANXIETY: ICD-10-CM

## 2022-12-14 RX ORDER — LORAZEPAM 1 MG/1
TABLET ORAL
Qty: 30 TABLET | OUTPATIENT
Start: 2022-12-14

## 2022-12-14 NOTE — TELEPHONE ENCOUNTER
Please call pt advise just filled last month.  Per our last conversation she does not use this medicaiton often just as needed which is not often.  It is too soon for refill based upon our discussion.

## 2023-02-09 NOTE — TELEPHONE ENCOUNTER
Rx Refill Note        Requested Prescriptions     Pending Prescriptions Disp Refills   • Premarin 0.625 MG tablet [Pharmacy Med Name: PREMARIN 0.625 MG TABLET] 63 tablet      Sig: TAKE ONE TABLET BY MOUTH DAILY FOR 21 DAYS, THEN DO NOT TAKE FOR 7 DAYS      Last office visit with prescribing clinician: 11/8/2022   Last telemedicine visit with prescribing clinician: 3/6/2023   Next office visit with prescribing clinician: Visit date not found   {

## 2023-02-10 RX ORDER — CONJUGATED ESTROGENS 0.62 MG/1
TABLET, FILM COATED ORAL
Qty: 63 TABLET | Refills: 0 | Status: SHIPPED | OUTPATIENT
Start: 2023-02-10

## 2023-02-18 DIAGNOSIS — F41.9 ANXIETY: ICD-10-CM

## 2023-02-21 RX ORDER — LORAZEPAM 1 MG/1
TABLET ORAL
Qty: 30 TABLET | OUTPATIENT
Start: 2023-02-21

## 2023-03-06 ENCOUNTER — OFFICE VISIT (OUTPATIENT)
Dept: INTERNAL MEDICINE | Facility: CLINIC | Age: 58
End: 2023-03-06
Payer: COMMERCIAL

## 2023-03-06 VITALS
TEMPERATURE: 98 F | WEIGHT: 153 LBS | DIASTOLIC BLOOD PRESSURE: 74 MMHG | OXYGEN SATURATION: 99 % | BODY MASS INDEX: 26.12 KG/M2 | HEIGHT: 64 IN | HEART RATE: 53 BPM | SYSTOLIC BLOOD PRESSURE: 142 MMHG

## 2023-03-06 DIAGNOSIS — Z00.00 ENCOUNTER FOR MEDICAL EXAMINATION TO ESTABLISH CARE: ICD-10-CM

## 2023-03-06 DIAGNOSIS — F41.9 ANXIETY: ICD-10-CM

## 2023-03-06 DIAGNOSIS — R53.83 OTHER FATIGUE: Primary | ICD-10-CM

## 2023-03-06 LAB
ALBUMIN SERPL-MCNC: 4.6 G/DL (ref 3.5–5.2)
ALBUMIN/GLOB SERPL: 1.7 G/DL
ALP SERPL-CCNC: 71 U/L (ref 39–117)
ALT SERPL-CCNC: 14 U/L (ref 1–33)
APPEARANCE UR: ABNORMAL
AST SERPL-CCNC: 15 U/L (ref 1–32)
BACTERIA #/AREA URNS HPF: ABNORMAL /HPF
BASOPHILS # BLD AUTO: 0.03 10*3/MM3 (ref 0–0.2)
BASOPHILS NFR BLD AUTO: 0.5 % (ref 0–1.5)
BILIRUB SERPL-MCNC: 0.6 MG/DL (ref 0–1.2)
BILIRUB UR QL STRIP: NEGATIVE
BUN SERPL-MCNC: 16 MG/DL (ref 6–20)
BUN/CREAT SERPL: 21.1 (ref 7–25)
CALCIUM SERPL-MCNC: 8.9 MG/DL (ref 8.6–10.5)
CHLORIDE SERPL-SCNC: 99 MMOL/L (ref 98–107)
CHOLEST SERPL-MCNC: 249 MG/DL (ref 0–200)
CHOLEST/HDLC SERPL: 3.46 {RATIO}
CO2 SERPL-SCNC: 28.7 MMOL/L (ref 22–29)
COLOR UR: YELLOW
CREAT SERPL-MCNC: 0.76 MG/DL (ref 0.57–1)
EGFRCR SERPLBLD CKD-EPI 2021: 91 ML/MIN/1.73
EOSINOPHIL # BLD AUTO: 0.33 10*3/MM3 (ref 0–0.4)
EOSINOPHIL NFR BLD AUTO: 5.3 % (ref 0.3–6.2)
EPI CELLS #/AREA URNS HPF: ABNORMAL /HPF
ERYTHROCYTE [DISTWIDTH] IN BLOOD BY AUTOMATED COUNT: 11.7 % (ref 12.3–15.4)
GLOBULIN SER CALC-MCNC: 2.7 GM/DL
GLUCOSE SERPL-MCNC: 103 MG/DL (ref 65–99)
GLUCOSE UR QL STRIP: NEGATIVE
HCT VFR BLD AUTO: 39.4 % (ref 34–46.6)
HDLC SERPL-MCNC: 72 MG/DL (ref 40–60)
HGB BLD-MCNC: 13.2 G/DL (ref 12–15.9)
HGB UR QL STRIP: NEGATIVE
IMM GRANULOCYTES # BLD AUTO: 0.01 10*3/MM3 (ref 0–0.05)
IMM GRANULOCYTES NFR BLD AUTO: 0.2 % (ref 0–0.5)
KETONES UR QL STRIP: NEGATIVE
LDLC SERPL CALC-MCNC: 157 MG/DL (ref 0–100)
LEUKOCYTE ESTERASE UR QL STRIP: ABNORMAL
LYMPHOCYTES # BLD AUTO: 1.66 10*3/MM3 (ref 0.7–3.1)
LYMPHOCYTES NFR BLD AUTO: 26.9 % (ref 19.6–45.3)
MCH RBC QN AUTO: 30.6 PG (ref 26.6–33)
MCHC RBC AUTO-ENTMCNC: 33.5 G/DL (ref 31.5–35.7)
MCV RBC AUTO: 91.4 FL (ref 79–97)
MONOCYTES # BLD AUTO: 0.39 10*3/MM3 (ref 0.1–0.9)
MONOCYTES NFR BLD AUTO: 6.3 % (ref 5–12)
NEUTROPHILS # BLD AUTO: 3.75 10*3/MM3 (ref 1.7–7)
NEUTROPHILS NFR BLD AUTO: 60.8 % (ref 42.7–76)
NITRITE UR QL STRIP: POSITIVE
NRBC BLD AUTO-RTO: 0 /100 WBC (ref 0–0.2)
PH UR STRIP: 6.5 [PH] (ref 5–8)
PLATELET # BLD AUTO: 270 10*3/MM3 (ref 140–450)
POTASSIUM SERPL-SCNC: 4.2 MMOL/L (ref 3.5–5.2)
PROT SERPL-MCNC: 7.3 G/DL (ref 6–8.5)
PROT UR QL STRIP: ABNORMAL
RBC # BLD AUTO: 4.31 10*6/MM3 (ref 3.77–5.28)
RBC #/AREA URNS HPF: ABNORMAL /HPF
SODIUM SERPL-SCNC: 138 MMOL/L (ref 136–145)
SP GR UR STRIP: 1.02 (ref 1–1.03)
TRIGL SERPL-MCNC: 117 MG/DL (ref 0–150)
TSH SERPL DL<=0.005 MIU/L-ACNC: 1.97 UIU/ML (ref 0.27–4.2)
UROBILINOGEN UR STRIP-MCNC: ABNORMAL MG/DL
VIT B12 SERPL-MCNC: 1312 PG/ML (ref 211–946)
VLDLC SERPL CALC-MCNC: 20 MG/DL (ref 5–40)
WBC # BLD AUTO: 6.17 10*3/MM3 (ref 3.4–10.8)
WBC #/AREA URNS HPF: ABNORMAL /HPF

## 2023-03-06 RX ORDER — FLUOXETINE HYDROCHLORIDE 20 MG/1
20 CAPSULE ORAL DAILY
Qty: 180 CAPSULE | Refills: 2 | Status: SHIPPED | OUTPATIENT
Start: 2023-03-06

## 2023-03-06 NOTE — PROGRESS NOTES
"Chief Complaint  Establish Care, Hyperlipidemia, and Hypertension    Subjective        Adela Morrison presents to [facility]  History of Present Illness  57-year-old female presenting with hyperlipidemia, hypertension and to establish care.  Previous patient of Dr. Alarcon.  Works as an .  Just recently  and returned from a [facility] unable.  [place].  Has been feeling generally tired but not sick recently.  Patient fairly regularly takes lorazepam for anxiety.  She also has chronic neck pain and occasionally takes hydrocodone-acetaminophen  mg.  Neck pain is very infrequent.  Controlled substance agreement to be signed today.  Urine drug screen up-to-date as of November 2022.  Generally feels healthy otherwise.  No current health concerns.              Objective   Vital Signs:  /74   Pulse 53   Temp 98 °F (36.7 °C)   Ht 163.2 cm (64.25\")   Wt 69.4 kg (153 lb)   SpO2 99%   BMI 26.06 kg/m²   Estimated body mass index is 26.06 kg/m² as calculated from the following:    Height as of this encounter: 163.2 cm (64.25\").    Weight as of this encounter: 69.4 kg (153 lb).             Physical Exam  Vitals reviewed.   Constitutional:       Appearance: Normal appearance.   HENT:      Head: Normocephalic.   Cardiovascular:      Rate and Rhythm: Normal rate and regular rhythm.      Pulses: Normal pulses.      Heart sounds: Normal heart sounds.   Pulmonary:      Effort: Pulmonary effort is normal.      Breath sounds: Normal breath sounds.   Musculoskeletal:         General: Normal range of motion.      Cervical back: Normal range of motion.   Skin:     General: Skin is warm and dry.      Capillary Refill: Capillary refill takes less than 2 seconds.   Neurological:      General: No focal deficit present.      Mental Status: She is alert and oriented to person, place, and time.   Psychiatric:         Mood and " Affect: Mood normal.         Behavior: Behavior normal.         Thought Content: Thought content normal.         Judgment: Judgment normal.        Result Review :    Common labs    Common Labs 8/1/22 8/1/22 11/8/22 11/8/22 3/6/23 3/6/23 3/6/23    1049 1049 0922 0922 0958 0958 0958   Glucose  103 (A) 98   103 (A)    BUN  11 14   16    Creatinine  0.80 0.77   0.76    Sodium  134 136   138    Potassium  4.4 4.7   4.2    Chloride  96 99   99    Calcium  9.2 9.1   8.9    Total Protein  7.2 6.9   7.3    Albumin  4.4 4.20   4.6    Total Bilirubin  0.6 0.5   0.6    Alkaline Phosphatase  65 63   71    AST (SGOT)  18 18   15    ALT (SGPT)  14 17   14    WBC     6.17     Hemoglobin     13.2     Hematocrit     39.4     Platelets     270     Total Cholesterol 241 (A)   224 (A)   249 (A)   Triglycerides 113   83   117   HDL Cholesterol 75   71 (A)   72 (A)   LDL Cholesterol  146 (A)   139 (A)   157 (A)   (A) Abnormal value       Comments are available for some flowsheets but are not being displayed.               Current Outpatient Medications on File Prior to Visit   Medication Sig Dispense Refill   • HYDROcodone-acetaminophen (NORCO)  MG per tablet Take 1 tablet by mouth Every 8 (Eight) Hours As Needed for Moderate Pain . Need to use sparingly- this med is addictive! 15 tablet 0   • losartan-hydrochlorothiazide (HYZAAR) 100-12.5 MG per tablet Take 1 tablet by mouth Daily. 90 tablet 2   • Premarin 0.625 MG tablet TAKE ONE TABLET BY MOUTH DAILY FOR 21 DAYS, THEN DO NOT TAKE FOR 7 DAYS 63 tablet 0     No current facility-administered medications on file prior to visit.              Assessment and Plan   Diagnoses and all orders for this visit:    1. Other fatigue (Primary)  -     Vitamin B12    2. Anxiety  -     FLUoxetine (PROzac) 20 MG capsule; Take 1 capsule by mouth Daily.  Dispense: 180 capsule; Refill: 2    3. Encounter for medical examination to establish care  -     CBC & Differential  -     Comprehensive Metabolic  Panel  -     Lipid Panel With / Chol / HDL Ratio  -     TSH Rfx On Abnormal To Free T4  -     Urinalysis With Microscopic If Indicated (No Culture) - Urine, Clean Catch    Other orders  -     Microscopic Examination -        Patient Instructions   Continue medications as prescribed. Refills to be sent when appropriate. Labs today. Stay hydrated with water. Follow-up in 6 months for annual exam.              Follow Up   Return in about 6 months (around 9/6/2023) for Annual physical.  Patient was given instructions and counseling regarding her condition or for health maintenance advice. Please see specific information pulled into the AVS if appropriate.

## 2023-03-06 NOTE — PATIENT INSTRUCTIONS
Continue medications as prescribed. Refills to be sent when appropriate. Labs today. Stay hydrated with water. Follow-up in 6 months for annual exam.

## 2023-03-07 NOTE — PROGRESS NOTES
CBC and metabolic panel are unremarkable.  No signs of infection, anemia, kidney injury, liver injury.  Slightly elevated blood glucose.    LDL (bad cholesterol) elevated 157.  Goal is 100 or less. Recommend limiting intake of red meat, pork, fried foods and high fat dairy products. Limit intake of alcohol. Recommend at least 30 minutes of heart elevating exercises three days a week as tolerated.     Urinalysis shows cloudy appearance with protein, white cells and bacteria present.  Without other signs of infection, recommend staying hydrated with water to flush bacteria out of urinary tract.  We will continue to monitor.    Vitamin B12 is elevated.  This is not the reason for recent fatigue and tiredness.

## 2023-03-08 DIAGNOSIS — F41.9 ANXIETY: ICD-10-CM

## 2023-03-13 RX ORDER — LORAZEPAM 1 MG/1
1 TABLET ORAL DAILY PRN
Qty: 30 TABLET | Refills: 2 | Status: SHIPPED | OUTPATIENT
Start: 2023-03-13

## 2023-03-28 DIAGNOSIS — M54.2 NECK PAIN: ICD-10-CM

## 2023-03-28 DIAGNOSIS — R52 PAIN: ICD-10-CM

## 2023-03-28 RX ORDER — HYDROCODONE BITARTRATE AND ACETAMINOPHEN 10; 325 MG/1; MG/1
1 TABLET ORAL EVERY 8 HOURS PRN
Qty: 15 TABLET | Refills: 0 | Status: CANCELLED | OUTPATIENT
Start: 2023-03-28

## 2023-03-31 DIAGNOSIS — M54.2 NECK PAIN: ICD-10-CM

## 2023-03-31 DIAGNOSIS — R52 PAIN: ICD-10-CM

## 2023-03-31 RX ORDER — HYDROCODONE BITARTRATE AND ACETAMINOPHEN 10; 325 MG/1; MG/1
1 TABLET ORAL EVERY 8 HOURS PRN
Qty: 15 TABLET | Refills: 0 | Status: SHIPPED | OUTPATIENT
Start: 2023-03-31

## 2023-04-20 RX ORDER — CONJUGATED ESTROGENS 0.62 MG/1
TABLET, FILM COATED ORAL
Qty: 63 TABLET | Refills: 0 | Status: SHIPPED | OUTPATIENT
Start: 2023-04-20

## 2023-05-26 DIAGNOSIS — I10 ESSENTIAL HYPERTENSION: ICD-10-CM

## 2023-05-26 DIAGNOSIS — F41.9 ANXIETY: ICD-10-CM

## 2023-05-26 RX ORDER — FLUOXETINE HYDROCHLORIDE 20 MG/1
20 CAPSULE ORAL DAILY
Qty: 180 CAPSULE | Refills: 2 | Status: SHIPPED | OUTPATIENT
Start: 2023-05-26

## 2023-05-26 RX ORDER — LOSARTAN POTASSIUM AND HYDROCHLOROTHIAZIDE 12.5; 1 MG/1; MG/1
1 TABLET ORAL DAILY
Qty: 90 TABLET | Refills: 2 | Status: SHIPPED | OUTPATIENT
Start: 2023-05-26

## 2023-07-20 NOTE — TELEPHONE ENCOUNTER
[facility] INPATIENT PROGRESS NOTE    Length of Stay:  6 days    CHIEF COMPLAINT/REASON FOR VISIT:  Breast cancer currently on neoadjuvant therapy with Taxol, Herceptin, Perjeta  Adverse effects of therapy including nausea, vomiting, diarrhea  Volume depletion  Acute kidney injury  Hypokalemia    Interval history:  7/17/2023  T90.8, pulse 102, respirations 18, BP 99/58, SPO2 97%  Patient frustrated about side effects of chemotherapy but states that the breasts are clearly improved.  She overall is feeling better.  H&H 8.3 and 25.1, white count of 7610, platelet count 27 1000, BUN/creatinine of 13 and 0.98, calcium 8.3, albumin 2.5, normal transaminases    7/18/2023  T99.5, pulse 100, respirations 16, /71, SPO2 of 94%  Patient doing about the same  H&H 9.0 and 26.8, white count 8120, platelet count 2 95,000, BUN/creatinine of 14 and 0.93, calcium 8.5, albumin 2.6, normal transaminases.  Ultrasound reveals 3.7 x 2.4 x 3.7 hypoechoic mass extending into the overlying skin previously 3.8 x 2.5 x 4.2, slightly smaller, right axillary mass at 2.6 x 1.5 x 1.9 not significantly changed.    7/19/2023  T 99 degrees, respirations 22, /74, SPO2 97%  Patient states that she is feeling somewhat better and hoping to undergo short-term rehab.  She has been advised to see Dr. Michael in follow-up appointment for scheduling surgery.    7/20/2023  T98.4, pulse 87, respirations 18, /65, SPO2 97%  Now awaiting skilled nursing facility  H&H 8.7 26.8, white count is 7230, platelet count of 2 83,000, BUN/creatinine of 11 and 0.79  The patient has completed IV antibiotic therapy, gradual recovery from chemotherapy toxicity, skilled nursing facility planned and follow-up surgery with Dr. Michael (discussed with surgery) in the next 2 to 3 weeks to be considered.        OBJECTIVE:  Vitals:    07/19/23 1200 07/19/23 1602 07/19/23 1942 07/20/23 0525   BP: 118/72 142/70 117/64 131/65   BP Location: Left arm Left arm  PA approved through 10/11/2019.  [facility] notified.     Left arm Left arm   Patient Position: Lying Lying Lying Lying   Pulse:  103 105 87   Resp:  20 20 18   Temp:  99.4 °F (37.4 °C) 99.2 °F (37.3 °C) 98.4 °F (36.9 °C)   TempSrc: Oral Oral Oral Oral   SpO2: 99% 92% 92% 97%   Weight:       Height:             PHYSICAL EXAMINATION:   General:  No acute distress, awake, alert and oriented.  Sitting up in bed and alert  Skin:  Warm and dry, no visible rash  HEENT:  Normocephalic/atraumatic.    Chest:  Normal respiratory effort. Mediport present in the L chest, accessed.  Right breast retracted but, apparently, improved.  We will follow  Extremities:  No visible clubbing, cyanosis, or edema  Neuro/psych:  Grossly nonfocal.  Normal mood and affect.       DIAGNOSTIC DATA:  Results Review:     I reviewed the patient's new clinical results.    Results from last 7 days   Lab Units 07/20/23  0601   WBC 10*3/mm3 7.23   HEMOGLOBIN g/dL 8.7*   HEMATOCRIT % 26.8*   PLATELETS 10*3/mm3 283     Lab Results   Component Value Date    NEUTROABS 5.13 07/20/2023     Results from last 7 days   Lab Units 07/20/23  0601   SODIUM mmol/L 141   POTASSIUM mmol/L 3.8   CHLORIDE mmol/L 111*   CO2 mmol/L 21.0*   BUN mg/dL 11   CREATININE mg/dL 0.79   GLUCOSE mg/dL 148*   CALCIUM mg/dL 8.1*         Results from last 7 days   Lab Units 07/20/23  0601   MAGNESIUM mg/dL 2.0         IMAGING:    None reviewed    ASSESSMENT:  This is a 81 y.o. female with:     *Right breast inflammatory breast cancer  T4d N1 M0  Stage IIIb  ER negative, CT negative, HER2 2+ on immunohistochemistry but amplified on FISH.  Invasive ductal carcinoma with apocrine features, grade 3, Ki-67 38%  CT scans and bone scan without any obvious evidence of metastatic disease  Plan Taxol Herceptin and Perjeta, and if she tolerates this well we will proceed with Adriamycin and cyclophosphamide.  Echocardiogram has been performed and ejection fraction normal.  Liver shows cirrhotic morphology.  Mediport placed 5/19/2023  Taxol, Herceptin,  Perjeta initiated 5/24/2023 5/31/2023 C1D8 Taxol  6/28/2023-cycle 2-day 15 of Taxol.  She is overall tolerating therapy well with expected side effects.  7/5/23: cycle 3 day 1 and she received taxol, herceptin, perjeta  7/12/2023-cycle 3-day 8 of TPH.  Taxol held due to severe dehydration, poorly controlled nausea vomiting, KAMILA. Admitted for these symptoms.  Fortunately now much improved.  7/18/2023, patient improving from symptoms from chemotherapy.  Now trying to determine whether we should try to continue a portion of the same therapy after review by general surgery.  Patient seen 7/19/2023, now surgical candidate to be scheduled in the next several weeks to Dr. Bender.     *Diabetes  Poorly controlled  Evaluation by endocrinology 5/30/2023 with recommendations for dietary changes and home blood sugar monitoring.  The patient's daughter reports today she is struggling with compliance.  Her oral intake is still fairly poor.  Emphasized on regular food intake to maintain blood sugars.  According to daughter blood sugars have been elevated in the 300s and 400s.  They are not able to control blood sugars adequately.     *Hyperkalemia and chronic kidney disease, now with hypokalemia on admission  Potassium remains low at 2.9.  Continue potassium repletion.  Normalized 7/17/2020 3-4.0    *Hypomagnesemia  Magnesium 2.1, from 1.5, from 1.2  Continue repletion prn-normalized 7/17/2020 3-1.7     *KAMILA/CKD  Creatinine elevated at 2.63, BUN 37 at presentation on 7/12.  Secondary to severe volume depletion.  Calcium also elevated at 10.3  Reason for admission  Creatinine 0.86 from 0.93 from 0.98 from 0.91, from 0.90, from 0.93, from 1.43, from 1.99, from 2.63, from 0.95     *Cirrhotic appearance of the liver on the CT scan  Referred to gastroenterology  Synthetic function appears to be normal  We will start with Taxol to see if she would tolerate the chemotherapy   Slight elevation of intervention studies today with ALT 43,  AST 55.  Continue to monitor weekly  6/28/2023-mild elevation in the liver labs.  Stable compared to previous labs.      *Decreased oral intake secondary to chemotherapy  She has lost a significant amount of weight..  Continue follow-up with nutrition  Continue entergrade outpatient     *Frequent eructation  Continue Protonix 40 mg daily     *Cognitive impairment  It is unclear if this is the patient's baseline or mental status has been exacerbated by recent chemotherapy  The patient is struggling with compliance with her medications.  The patient's daughter expresses frustration today as the patient is struggling to care for herself at home with managing medications and symptom management.  Evaluated by CARL Antonio      *Chemotherapy-induced diarrhea  6/5/2023 patient given Enterade (Wild Berry flavor).  She has been drinking 1 a day.  She does not necessarily like the flavor (currently mixing with sugar-free Aramis-Aid which does help).  Encouraged her to increase to 2 a day.  6/21/2023 patient reports that she had stopped drinking her Enterade because she was waking up in the middle the night having diarrhea although she did not know if it was due to the Enterade her protein shakes.  I have encouraged the patient to continue Enterade, drinking it in the morning.  Try discontinuing the protein shakes and see how she does.  Patient states that she will try this  Reports 2-3 loose stools.  Continue Enterade and Imodium.  7/5/2023 continuing to have issues with diarrhea up to 3-4 bowel movements a day, patient also recently prescribed Kayexalate for an apparently elevated potassium.  Potassium only 3.1 today.  She advised to discontinue the Kayexalate she was given IV hydration today.  We will also prescribe Lomotil to use if needed to help better control her diarrhea.  Patient is not drinking Enterade regularly.  This has been better controlled with Lomotil as an outpatient  Assessed 7/18/2023 finally  improving.     *Maculopapular rash  Secondary to HER2 directed therapy  Recommend starting topical steroids to help with the rash  She quit taking doxycycline as she developed a blister with that.  Medrol Dosepak prescribed.  Continue topical steroids     *Severe nausea and vomiting  Patient has been using Zofran regularly.  Will nee to utilize Zyprexa and Compazine for future outpatient use.  Reason for admission  Olanzapine 5 mg at night recently initiated     *Normocytic anemia  Likely due to chemo and IV fluid hydration     *Renal u/s on 7/6 with possible left renal mass  Not noted on CT imaging from 5/11/23. No further imaging at this time.   Urology evaluated.  We will be having follow-up imaging from an oncology standpoint so this will be monitored     *UTI  Urine culture with >100,000 GNR, Klebsiella pneumoniae, resistant to ampicillin but otherwise susceptible  On Rocephin    RECOMMENDATIONS/PLAN:  Holding chemotherapy which has been poorly tolerated.  Patient, now, evidently, a surgical candidate which is to be scheduled after short-term rehabilitation stay  Rocephin continues for Klebsiella urinary tract infection  Valsartan and HCTZ being held for now and at discharge per nephrology recommendations  Topical steroids for the rash which is improving  Symptom management for nausea and vomiting.  She continues olanzapine 5 mg nightly.  Can further evaluate L kidney as an outpatient when she's clinically doing better and labs have normalized. Breast cancer needs attention first.  Nothing was noted in the left kidney on CT imaging done on 5/11/2023  Daily labs while admitted though these will be adjusted  Will follow  Plan discharge to a skilled nursing facility for rehabilitation, likely [facility], when appropriate and bed available    Hank Nelson MD

## 2023-08-12 DIAGNOSIS — I10 ESSENTIAL HYPERTENSION: ICD-10-CM

## 2023-08-14 RX ORDER — LOSARTAN POTASSIUM AND HYDROCHLOROTHIAZIDE 12.5; 1 MG/1; MG/1
TABLET ORAL
Qty: 90 TABLET | Refills: 0 | Status: SHIPPED | OUTPATIENT
Start: 2023-08-14

## 2023-09-08 ENCOUNTER — OFFICE VISIT (OUTPATIENT)
Dept: INTERNAL MEDICINE | Facility: CLINIC | Age: 58
End: 2023-09-08
Payer: COMMERCIAL

## 2023-09-08 VITALS
DIASTOLIC BLOOD PRESSURE: 72 MMHG | HEIGHT: 64 IN | SYSTOLIC BLOOD PRESSURE: 138 MMHG | OXYGEN SATURATION: 98 % | WEIGHT: 154 LBS | BODY MASS INDEX: 26.29 KG/M2 | HEART RATE: 65 BPM

## 2023-09-08 DIAGNOSIS — Z78.0 MENOPAUSE: ICD-10-CM

## 2023-09-08 DIAGNOSIS — G89.4 CHRONIC PAIN SYNDROME: Primary | ICD-10-CM

## 2023-09-08 DIAGNOSIS — M54.2 NECK PAIN: ICD-10-CM

## 2023-09-08 DIAGNOSIS — E55.9 VITAMIN D DEFICIENCY: ICD-10-CM

## 2023-09-08 DIAGNOSIS — I10 PRIMARY HYPERTENSION: ICD-10-CM

## 2023-09-08 DIAGNOSIS — F41.9 ANXIETY: ICD-10-CM

## 2023-09-08 DIAGNOSIS — Z12.31 ENCOUNTER FOR SCREENING MAMMOGRAM FOR MALIGNANT NEOPLASM OF BREAST: ICD-10-CM

## 2023-09-08 DIAGNOSIS — Z79.899 LONG TERM CURRENT USE OF THERAPEUTIC DRUG: ICD-10-CM

## 2023-09-08 DIAGNOSIS — R52 PAIN: ICD-10-CM

## 2023-09-08 DIAGNOSIS — Z00.00 ANNUAL PHYSICAL EXAM: ICD-10-CM

## 2023-09-08 DIAGNOSIS — E78.49 OTHER HYPERLIPIDEMIA: ICD-10-CM

## 2023-09-08 RX ORDER — HYDROCODONE BITARTRATE AND ACETAMINOPHEN 10; 325 MG/1; MG/1
1 TABLET ORAL EVERY 8 HOURS PRN
Qty: 15 TABLET | Refills: 0 | Status: SHIPPED | OUTPATIENT
Start: 2023-09-08

## 2023-09-08 NOTE — PROGRESS NOTES
"Chief Complaint  Annual Exam    Subjective        Adela Morrison presents to [facility] PRIMARY CARE  History of Present Illness  57-year-old female presenting for annual exam.  Has had increased stress at work.  Is not sleeping as well due to the stress.  Has trouble both falling asleep and waking up in the middle of the night starting weight watchers this week.  Says that she has done well in the past with portion control when trying to lose weight.  Is also looking for ways to increase exercise.  Does not regularly check blood pressure at home.  Taking medications as prescribed.  Has run out of hydrocodone-acetaminophen for chronic back pain.  Does not routinely take.  Still has lorazepam available if needed.  Has not been taking regularly.  May take once every couple weeks.  Has been told that she has been talking louder than usual.  Also has been watching TV louder.  Hearing changes is not affected her daily life.  Denies chest pain, difficulty breathing, swelling of hands or feet, constipation, dysuria and changes in vision.    Objective   Vital Signs:  /72   Pulse 65   Ht 163.2 cm (64.25\")   Wt 69.9 kg (154 lb)   SpO2 98%   BMI 26.23 kg/m²   Estimated body mass index is 26.23 kg/m² as calculated from the following:    Height as of this encounter: 163.2 cm (64.25\").    Weight as of this encounter: 69.9 kg (154 lb).       BMI is >= 25 and <30. (Overweight) The following options were offered after discussion;: exercise counseling/recommendations and nutrition counseling/recommendations      Physical Exam  Vitals reviewed.   Constitutional:       Appearance: Normal appearance.   HENT:      Head: Normocephalic.      Right Ear: Tympanic membrane normal.      Left Ear: Tympanic membrane normal.      Nose: Nose normal.      Mouth/Throat:      Mouth: Mucous membranes are moist.      Pharynx: Oropharynx is clear.   Eyes:      Pupils: Pupils are equal, round, and reactive to light. "   Cardiovascular:      Rate and Rhythm: Normal rate.      Pulses: Normal pulses.      Heart sounds: Normal heart sounds.   Pulmonary:      Effort: Pulmonary effort is normal.      Breath sounds: Normal breath sounds.   Abdominal:      General: Bowel sounds are normal.      Palpations: Abdomen is soft.   Musculoskeletal:         General: Normal range of motion.      Cervical back: Normal range of motion.   Skin:     General: Skin is warm and dry.      Capillary Refill: Capillary refill takes less than 2 seconds.   Neurological:      General: No focal deficit present.      Mental Status: She is alert and oriented to person, place, and time.   Psychiatric:         Mood and Affect: Mood normal.         Behavior: Behavior normal.         Thought Content: Thought content normal.         Judgment: Judgment normal.      Result Review :    Common labs          11/8/2022    09:22 3/6/2023    09:58   Common Labs   Glucose 98  103    BUN 14  16    Creatinine 0.77  0.76    Sodium 136  138    Potassium 4.7  4.2    Chloride 99  99    Calcium 9.1  8.9    Total Protein 6.9  7.3    Albumin 4.20  4.6    Total Bilirubin 0.5  0.6    Alkaline Phosphatase 63  71    AST (SGOT) 18  15    ALT (SGPT) 17  14    WBC  6.17    Hemoglobin  13.2    Hematocrit  39.4    Platelets  270    Total Cholesterol 224  249    Triglycerides 83  117    HDL Cholesterol 71  72    LDL Cholesterol  139  157        Current Outpatient Medications on File Prior to Visit   Medication Sig Dispense Refill    FLUoxetine (PROzac) 20 MG capsule Take 1 capsule by mouth Daily. 180 capsule 2    HYDROcodone-acetaminophen (NORCO)  MG per tablet Take 1 tablet by mouth Every 8 (Eight) Hours As Needed for Moderate Pain. Need to use sparingly- this med is addictive! 15 tablet 0    LORazepam (ATIVAN) 1 MG tablet Take 1 tablet by mouth Daily As Needed for Anxiety. Use sparingly. 30 tablet 2    losartan-hydrochlorothiazide (HYZAAR) 100-12.5 MG per tablet TAKE ONE TABLET BY MOUTH  DAILY 90 tablet 0    predniSONE (DELTASONE) 10 MG tablet Take 4 tablets by mouth on days 1-2. Take 3 tablets by mouth on days 3-4. Take 2 tablets by mouth on days 5-8. Take 1 tablet by mouth on days 9-12. 26 tablet 0    [DISCONTINUED] estrogens, conjugated, (Premarin) 0.625 MG tablet Take 1 tablet by mouth Daily. Take daily for 21 days then do not take for 7 days. 63 tablet 0     No current facility-administered medications on file prior to visit.                Assessment and Plan   Diagnoses and all orders for this visit:    1. Chronic pain syndrome (Primary)  -     Compliance Drug Analysis, Ur - Urine, Clean Catch    2. Primary hypertension  -     Comprehensive Metabolic Panel  -     Urinalysis With Microscopic If Indicated (No Culture) - Urine, Clean Catch  -     Lipid Panel With / Chol / HDL Ratio  -     TSH Rfx On Abnormal To Free T4  -     CBC & Differential    3. Other hyperlipidemia  -     Comprehensive Metabolic Panel  -     Urinalysis With Microscopic If Indicated (No Culture) - Urine, Clean Catch  -     Lipid Panel With / Chol / HDL Ratio  -     TSH Rfx On Abnormal To Free T4  -     CBC & Differential    4. Vitamin D deficiency  -     Vitamin D,25-Hydroxy    5. Anxiety    6. Annual physical exam  -     Comprehensive Metabolic Panel  -     Urinalysis With Microscopic If Indicated (No Culture) - Urine, Clean Catch  -     Lipid Panel With / Chol / HDL Ratio  -     TSH Rfx On Abnormal To Free T4  -     CBC & Differential    7. Long term current use of therapeutic drug  -     Compliance Drug Analysis, Ur - Urine, Clean Catch    8. Encounter for screening mammogram for malignant neoplasm of breast  -     Mammo Screening Bilateral With CAD; Future    9. Menopause  -     estrogens, conjugated, (Premarin) 0.625 MG tablet; Take 1 tablet by mouth Daily. Take daily for 21 days then do not take for 7 days.  Dispense: 63 tablet; Refill: 0      Patient Instructions   Continue medications as prescribed. Stay active.  Stay hydrated with water. Wear sunscreen. Stretch before bed. Recommend COVID booster and flu shot in October. Mammogram ordered. Scheduling center to call with appointment. Labs today. Refill of [place] to be sent by Dr. Blair. Follow-up in 6 months for recheck.       The patient was counseled regarding nutrition, physical activity, healthy weight, injury prevention, misuse of tobacco, alcohol and illicit drugs, mental health, immunizations, and screenings.           Follow Up   Return in about 6 months (around 3/8/2024) for Recheck.  Patient was given instructions and counseling regarding her condition or for health maintenance advice. Please see specific information pulled into the AVS if appropriate.

## 2023-09-09 LAB
25(OH)D3+25(OH)D2 SERPL-MCNC: 22.8 NG/ML (ref 30–100)
ALBUMIN SERPL-MCNC: 4.4 G/DL (ref 3.8–4.9)
ALBUMIN/GLOB SERPL: 1.6 {RATIO} (ref 1.2–2.2)
ALP SERPL-CCNC: 65 IU/L (ref 44–121)
ALT SERPL-CCNC: 16 IU/L (ref 0–32)
APPEARANCE UR: CLEAR
AST SERPL-CCNC: 16 IU/L (ref 0–40)
BACTERIA #/AREA URNS HPF: ABNORMAL /[HPF]
BASOPHILS # BLD AUTO: 0 X10E3/UL (ref 0–0.2)
BASOPHILS NFR BLD AUTO: 0 %
BILIRUB SERPL-MCNC: 0.8 MG/DL (ref 0–1.2)
BILIRUB UR QL STRIP: NEGATIVE
BUN SERPL-MCNC: 13 MG/DL (ref 6–24)
BUN/CREAT SERPL: 18 (ref 9–23)
CALCIUM SERPL-MCNC: 8.9 MG/DL (ref 8.7–10.2)
CASTS URNS QL MICRO: ABNORMAL /LPF
CHLORIDE SERPL-SCNC: 96 MMOL/L (ref 96–106)
CHOLEST SERPL-MCNC: 242 MG/DL (ref 100–199)
CHOLEST/HDLC SERPL: 3.7 RATIO (ref 0–4.4)
CO2 SERPL-SCNC: 23 MMOL/L (ref 20–29)
COLOR UR: YELLOW
CREAT SERPL-MCNC: 0.74 MG/DL (ref 0.57–1)
EGFRCR SERPLBLD CKD-EPI 2021: 94 ML/MIN/1.73
EOSINOPHIL # BLD AUTO: 0.1 X10E3/UL (ref 0–0.4)
EOSINOPHIL NFR BLD AUTO: 2 %
EPI CELLS #/AREA URNS HPF: ABNORMAL /HPF (ref 0–10)
ERYTHROCYTE [DISTWIDTH] IN BLOOD BY AUTOMATED COUNT: 12.3 % (ref 11.7–15.4)
GLOBULIN SER CALC-MCNC: 2.7 G/DL (ref 1.5–4.5)
GLUCOSE SERPL-MCNC: 91 MG/DL (ref 70–99)
GLUCOSE UR QL STRIP: NEGATIVE
HCT VFR BLD AUTO: 39.5 % (ref 34–46.6)
HDLC SERPL-MCNC: 65 MG/DL
HGB BLD-MCNC: 13 G/DL (ref 11.1–15.9)
HGB UR QL STRIP: NEGATIVE
IMM GRANULOCYTES # BLD AUTO: 0 X10E3/UL (ref 0–0.1)
IMM GRANULOCYTES NFR BLD AUTO: 0 %
KETONES UR QL STRIP: ABNORMAL
LDLC SERPL CALC-MCNC: 151 MG/DL (ref 0–99)
LEUKOCYTE ESTERASE UR QL STRIP: ABNORMAL
LYMPHOCYTES # BLD AUTO: 1.6 X10E3/UL (ref 0.7–3.1)
LYMPHOCYTES NFR BLD AUTO: 29 %
MCH RBC QN AUTO: 30.4 PG (ref 26.6–33)
MCHC RBC AUTO-ENTMCNC: 32.9 G/DL (ref 31.5–35.7)
MCV RBC AUTO: 92 FL (ref 79–97)
MICRO URNS: ABNORMAL
MONOCYTES # BLD AUTO: 0.4 X10E3/UL (ref 0.1–0.9)
MONOCYTES NFR BLD AUTO: 6 %
MUCOUS THREADS URNS QL MICRO: PRESENT
NEUTROPHILS # BLD AUTO: 3.4 X10E3/UL (ref 1.4–7)
NEUTROPHILS NFR BLD AUTO: 63 %
NITRITE UR QL STRIP: POSITIVE
PH UR STRIP: 7 [PH] (ref 5–7.5)
PLATELET # BLD AUTO: 235 X10E3/UL (ref 150–450)
POTASSIUM SERPL-SCNC: 4.7 MMOL/L (ref 3.5–5.2)
PROT SERPL-MCNC: 7.1 G/DL (ref 6–8.5)
PROT UR QL STRIP: NEGATIVE
RBC # BLD AUTO: 4.28 X10E6/UL (ref 3.77–5.28)
RBC #/AREA URNS HPF: ABNORMAL /HPF (ref 0–2)
SODIUM SERPL-SCNC: 134 MMOL/L (ref 134–144)
SP GR UR STRIP: 1.02 (ref 1–1.03)
TRIGL SERPL-MCNC: 146 MG/DL (ref 0–149)
TSH SERPL DL<=0.005 MIU/L-ACNC: 2.41 UIU/ML (ref 0.45–4.5)
UROBILINOGEN UR STRIP-MCNC: 1 MG/DL (ref 0.2–1)
VLDLC SERPL CALC-MCNC: 26 MG/DL (ref 5–40)
WBC # BLD AUTO: 5.6 X10E3/UL (ref 3.4–10.8)
WBC #/AREA URNS HPF: ABNORMAL /HPF (ref 0–5)

## 2023-09-11 NOTE — PROGRESS NOTES
Metabolic panel within normal limits.  No signs of diabetes, liver injury, kidney injury or electrolyte imbalance.   CBC within normal limits.  No signs of infection or anemia.  Thyroid levels within normal limits.  No signs of thyroid disease.    Urinalysis shows presence of white cells, ketones and nitrates.  Stay hydrated with water to help maintain overall kidney health.  We will continue to monitor in the future.    LDL (bad cholesterol) elevated 151.  Goal is 100 or lower.  Level should improve with recent change in diet.      Vitamin D levels are slightly low.  Recommend adding a vitamin D3 supplement of 5000 units daily to help improve vitamin D levels.  We will continue to monitor in the future.

## 2023-09-14 ENCOUNTER — TELEPHONE (OUTPATIENT)
Dept: INTERNAL MEDICINE | Facility: CLINIC | Age: 58
End: 2023-09-14
Payer: COMMERCIAL

## 2023-09-14 NOTE — TELEPHONE ENCOUNTER
Order for x-ray of shoulder canceled due to patient refusing. Advised patient to callback in a week if she has not heard form [facility] about scheduling her mammo

## 2023-09-15 LAB — DRUGS UR: NORMAL

## 2023-09-20 ENCOUNTER — TRANSCRIBE ORDERS (OUTPATIENT)
Dept: ADMINISTRATIVE | Facility: HOSPITAL | Age: 58
End: 2023-09-20
Payer: COMMERCIAL

## 2023-09-20 DIAGNOSIS — Z12.31 SCREENING MAMMOGRAM FOR BREAST CANCER: Primary | ICD-10-CM

## 2023-11-10 NOTE — TELEPHONE ENCOUNTER
Patient states she did not request refill pharmacy sent as auto refill, informed pharmacy denied and not requested   yes

## 2023-12-07 ENCOUNTER — HOSPITAL ENCOUNTER (OUTPATIENT)
Dept: MAMMOGRAPHY | Facility: HOSPITAL | Age: 58
Discharge: HOME | End: 2023-12-07
Payer: COMMERCIAL

## 2023-12-07 DIAGNOSIS — Z12.31 SCREENING MAMMOGRAM FOR BREAST CANCER: ICD-10-CM

## 2023-12-07 PROCEDURE — 77063 BREAST TOMOSYNTHESIS BI: CPT

## 2023-12-07 PROCEDURE — 77067 SCR MAMMO BI INCL CAD: CPT

## 2023-12-18 DIAGNOSIS — Z78.0 MENOPAUSE: ICD-10-CM

## 2024-02-21 ENCOUNTER — PATIENT MESSAGE (OUTPATIENT)
Dept: INTERNAL MEDICINE | Facility: CLINIC | Age: 59
End: 2024-02-21
Payer: COMMERCIAL

## 2024-02-21 DIAGNOSIS — M54.2 NECK PAIN: ICD-10-CM

## 2024-02-21 DIAGNOSIS — R52 PAIN: ICD-10-CM

## 2024-02-22 DIAGNOSIS — F41.9 ANXIETY: ICD-10-CM

## 2024-02-22 RX ORDER — LORAZEPAM 1 MG/1
1 TABLET ORAL DAILY PRN
Qty: 30 TABLET | Refills: 2 | Status: SHIPPED | OUTPATIENT
Start: 2024-02-22

## 2024-02-22 RX ORDER — HYDROCODONE BITARTRATE AND ACETAMINOPHEN 10; 325 MG/1; MG/1
1 TABLET ORAL EVERY 8 HOURS PRN
Qty: 15 TABLET | Refills: 0 | Status: SHIPPED | OUTPATIENT
Start: 2024-02-22

## 2024-02-22 NOTE — TELEPHONE ENCOUNTER
From: Adela Morrison  To: Leroy Elliott  Sent: 2/21/2024 12:35 PM EST  Subject: Lorazapan 1 mg    I have been out of this medication for some time. I am having extreme anxiety due to a major reorganization of the company I've worked for for 39 years. Please refill this medication ASAP....not sleeping and very nervous. Thanks